# Patient Record
Sex: MALE | Race: BLACK OR AFRICAN AMERICAN | NOT HISPANIC OR LATINO | URBAN - METROPOLITAN AREA
[De-identification: names, ages, dates, MRNs, and addresses within clinical notes are randomized per-mention and may not be internally consistent; named-entity substitution may affect disease eponyms.]

---

## 2019-04-19 ENCOUNTER — INPATIENT (INPATIENT)
Facility: HOSPITAL | Age: 84
LOS: 4 days | Discharge: NURSING, REHAB OR LONG-TERM CARE | DRG: 638 | End: 2019-04-24
Attending: INTERNAL MEDICINE | Admitting: INTERNAL MEDICINE
Payer: MEDICARE

## 2019-04-19 VITALS
OXYGEN SATURATION: 99 % | DIASTOLIC BLOOD PRESSURE: 95 MMHG | SYSTOLIC BLOOD PRESSURE: 215 MMHG | RESPIRATION RATE: 18 BRPM | HEART RATE: 74 BPM

## 2019-04-19 DIAGNOSIS — E16.2 HYPOGLYCEMIA, UNSPECIFIED: ICD-10-CM

## 2019-04-19 DIAGNOSIS — Z98.49 CATARACT EXTRACTION STATUS, UNSPECIFIED EYE: Chronic | ICD-10-CM

## 2019-04-19 LAB
ACETONE SERPL-MCNC: NEGATIVE — SIGNIFICANT CHANGE UP
ALBUMIN SERPL ELPH-MCNC: 3.7 G/DL — SIGNIFICANT CHANGE UP (ref 3.5–5)
ALP SERPL-CCNC: 70 U/L — SIGNIFICANT CHANGE UP (ref 40–120)
ALT FLD-CCNC: 23 U/L DA — SIGNIFICANT CHANGE UP (ref 10–60)
ANION GAP SERPL CALC-SCNC: 6 MMOL/L — SIGNIFICANT CHANGE UP (ref 5–17)
ANISOCYTOSIS BLD QL: SLIGHT — SIGNIFICANT CHANGE UP
AST SERPL-CCNC: 22 U/L — SIGNIFICANT CHANGE UP (ref 10–40)
BASOPHILS # BLD AUTO: 0.03 K/UL — SIGNIFICANT CHANGE UP (ref 0–0.2)
BASOPHILS NFR BLD AUTO: 0.3 % — SIGNIFICANT CHANGE UP (ref 0–2)
BILIRUB SERPL-MCNC: 0.7 MG/DL — SIGNIFICANT CHANGE UP (ref 0.2–1.2)
BUN SERPL-MCNC: 11 MG/DL — SIGNIFICANT CHANGE UP (ref 7–18)
CALCIUM SERPL-MCNC: 8.9 MG/DL — SIGNIFICANT CHANGE UP (ref 8.4–10.5)
CHLORIDE SERPL-SCNC: 108 MMOL/L — SIGNIFICANT CHANGE UP (ref 96–108)
CO2 SERPL-SCNC: 30 MMOL/L — SIGNIFICANT CHANGE UP (ref 22–31)
CREAT SERPL-MCNC: 1.04 MG/DL — SIGNIFICANT CHANGE UP (ref 0.5–1.3)
EOSINOPHIL # BLD AUTO: 0.18 K/UL — SIGNIFICANT CHANGE UP (ref 0–0.5)
EOSINOPHIL NFR BLD AUTO: 2.1 % — SIGNIFICANT CHANGE UP (ref 0–6)
GLUCOSE SERPL-MCNC: 33 MG/DL — CRITICAL LOW (ref 70–99)
HCT VFR BLD CALC: 42.5 % — SIGNIFICANT CHANGE UP (ref 39–50)
HGB BLD-MCNC: 13.7 G/DL — SIGNIFICANT CHANGE UP (ref 13–17)
HYPOCHROMIA BLD QL: SLIGHT — SIGNIFICANT CHANGE UP
IMM GRANULOCYTES NFR BLD AUTO: 0.2 % — SIGNIFICANT CHANGE UP (ref 0–1.5)
LYMPHOCYTES # BLD AUTO: 2.35 K/UL — SIGNIFICANT CHANGE UP (ref 1–3.3)
LYMPHOCYTES # BLD AUTO: 27.2 % — SIGNIFICANT CHANGE UP (ref 13–44)
MAGNESIUM SERPL-MCNC: 2.2 MG/DL — SIGNIFICANT CHANGE UP (ref 1.6–2.6)
MANUAL SMEAR VERIFICATION: SIGNIFICANT CHANGE UP
MCHC RBC-ENTMCNC: 23.9 PG — LOW (ref 27–34)
MCHC RBC-ENTMCNC: 32.2 GM/DL — SIGNIFICANT CHANGE UP (ref 32–36)
MCV RBC AUTO: 74 FL — LOW (ref 80–100)
MICROCYTES BLD QL: SLIGHT — SIGNIFICANT CHANGE UP
MONOCYTES # BLD AUTO: 0.75 K/UL — SIGNIFICANT CHANGE UP (ref 0–0.9)
MONOCYTES NFR BLD AUTO: 8.7 % — SIGNIFICANT CHANGE UP (ref 2–14)
NEUTROPHILS # BLD AUTO: 5.32 K/UL — SIGNIFICANT CHANGE UP (ref 1.8–7.4)
NEUTROPHILS NFR BLD AUTO: 61.5 % — SIGNIFICANT CHANGE UP (ref 43–77)
NRBC # BLD: 0 /100 WBCS — SIGNIFICANT CHANGE UP (ref 0–0)
NT-PROBNP SERPL-SCNC: 407 PG/ML — SIGNIFICANT CHANGE UP (ref 0–450)
PLAT MORPH BLD: NORMAL — SIGNIFICANT CHANGE UP
PLATELET # BLD AUTO: 228 K/UL — SIGNIFICANT CHANGE UP (ref 150–400)
POIKILOCYTOSIS BLD QL AUTO: SLIGHT — SIGNIFICANT CHANGE UP
POTASSIUM SERPL-MCNC: 3.5 MMOL/L — SIGNIFICANT CHANGE UP (ref 3.5–5.3)
POTASSIUM SERPL-SCNC: 3.5 MMOL/L — SIGNIFICANT CHANGE UP (ref 3.5–5.3)
PROT SERPL-MCNC: 7.6 G/DL — SIGNIFICANT CHANGE UP (ref 6–8.3)
RBC # BLD: 5.74 M/UL — SIGNIFICANT CHANGE UP (ref 4.2–5.8)
RBC # FLD: 15.2 % — HIGH (ref 10.3–14.5)
RBC BLD AUTO: ABNORMAL
SCHISTOCYTES BLD QL AUTO: SLIGHT — SIGNIFICANT CHANGE UP
SODIUM SERPL-SCNC: 144 MMOL/L — SIGNIFICANT CHANGE UP (ref 135–145)
TARGETS BLD QL SMEAR: SLIGHT — SIGNIFICANT CHANGE UP
WBC # BLD: 8.65 K/UL — SIGNIFICANT CHANGE UP (ref 3.8–10.5)
WBC # FLD AUTO: 8.65 K/UL — SIGNIFICANT CHANGE UP (ref 3.8–10.5)

## 2019-04-19 PROCEDURE — 99223 1ST HOSP IP/OBS HIGH 75: CPT

## 2019-04-19 PROCEDURE — 99291 CRITICAL CARE FIRST HOUR: CPT

## 2019-04-19 PROCEDURE — 70450 CT HEAD/BRAIN W/O DYE: CPT | Mod: 26

## 2019-04-19 PROCEDURE — 71045 X-RAY EXAM CHEST 1 VIEW: CPT | Mod: 26

## 2019-04-19 RX ORDER — HEPARIN SODIUM 5000 [USP'U]/ML
5000 INJECTION INTRAVENOUS; SUBCUTANEOUS EVERY 8 HOURS
Qty: 0 | Refills: 0 | Status: DISCONTINUED | OUTPATIENT
Start: 2019-04-19 | End: 2019-04-24

## 2019-04-19 RX ORDER — CARVEDILOL PHOSPHATE 80 MG/1
12.5 CAPSULE, EXTENDED RELEASE ORAL ONCE
Qty: 0 | Refills: 0 | Status: COMPLETED | OUTPATIENT
Start: 2019-04-19 | End: 2019-04-19

## 2019-04-19 RX ORDER — INSULIN LISPRO 100/ML
VIAL (ML) SUBCUTANEOUS
Qty: 0 | Refills: 0 | Status: DISCONTINUED | OUTPATIENT
Start: 2019-04-19 | End: 2019-04-24

## 2019-04-19 RX ORDER — DEXTROSE 50 % IN WATER 50 %
50 SYRINGE (ML) INTRAVENOUS ONCE
Qty: 0 | Refills: 0 | Status: COMPLETED | OUTPATIENT
Start: 2019-04-19 | End: 2019-04-19

## 2019-04-19 RX ORDER — SODIUM CHLORIDE 9 MG/ML
1000 INJECTION, SOLUTION INTRAVENOUS
Qty: 0 | Refills: 0 | Status: DISCONTINUED | OUTPATIENT
Start: 2019-04-19 | End: 2019-04-20

## 2019-04-19 RX ORDER — DEXTROSE 50 % IN WATER 50 %
25 SYRINGE (ML) INTRAVENOUS ONCE
Qty: 0 | Refills: 0 | Status: DISCONTINUED | OUTPATIENT
Start: 2019-04-19 | End: 2019-04-24

## 2019-04-19 RX ADMIN — CARVEDILOL PHOSPHATE 12.5 MILLIGRAM(S): 80 CAPSULE, EXTENDED RELEASE ORAL at 23:09

## 2019-04-19 RX ADMIN — Medication 50 MILLILITER(S): at 18:32

## 2019-04-19 RX ADMIN — SODIUM CHLORIDE 60 MILLILITER(S): 9 INJECTION, SOLUTION INTRAVENOUS at 23:18

## 2019-04-19 RX ADMIN — Medication 50 MILLILITER(S): at 16:59

## 2019-04-19 NOTE — H&P ADULT - ATTENDING COMMENTS
Vital Signs Last 24 Hrs  T(C): 37.2 (19 Apr 2019 19:15), Max: 37.2 (19 Apr 2019 19:15)  T(F): 99 (19 Apr 2019 19:15), Max: 99 (19 Apr 2019 19:15)  HR: 91 (19 Apr 2019 19:15) (74 - 91)  BP: 179/99 (19 Apr 2019 19:15) (179/99 - 215/95)  BP(mean): --  RR: 18 (19 Apr 2019 19:15) (18 - 18)  SpO2: 99% (19 Apr 2019 19:15) (96% - 99%) He is an 88 year old Faroese man with PMH of DM2 / HTN/ hypothyroidism/ seizure d/o who was brought into the ED on account of being found unresponsive at the base of the stair. There was no witness and the patient cannot give any additional details now. EMS was called and he was found to have a very low glucose level of 29. He received 1 ampoule of dextrose with improvement in mental status and brought to the ED.  Of note, he had similar episode a few months back and was in [facility] for 8 days. ON discharge per wife, he was told to stay off his insulin regimen. He has however continued to use insulin in addition to his OHA. In addition, he had refused to follow up with his PCP in months. He changed to a new PCP (Whit) but is yet to see him.  On admission, his glucose continues to fall below normal range despite glucose infusion intermittently.    Of note, his facial expression seems fixed, he has mild tremors at rest in his hands. His wife states he walks slowly/shuffling and with difficulty. No prior work up for Parkinsonism.    Vital Signs Last 24 Hrs  T(C): 37.2 (19 Apr 2019 19:15), Max: 37.2 (19 Apr 2019 19:15)  T(F): 99 (19 Apr 2019 19:15), Max: 99 (19 Apr 2019 19:15)  HR: 91 (19 Apr 2019 19:15) (74 - 91)  BP: 179/99 (19 Apr 2019 19:15) (179/99 - 215/95)  RR: 18 (19 Apr 2019 19:15) (18 - 18)  SpO2: 99% (19 Apr 2019 19:15) (96% - 99%)    Elderly man, lying in bed, alert, awake, oriented X 3  ?plastic facies, ?pill-rolling tremors  No pallor, no icterus, no lateral tongue laceration, no bruises on the head or body  CTA B/L  RRR, S1S2 only  Soft, NT ND BS +  +2 Pitting pedal edema in the LE   No focal deficits    Available meds reviewed    Labs                        13.7   8.65  )-----------( 228      ( 19 Apr 2019 17:17 )             42.5     04-19    144  |  108  |  11  ----------------------------<  33<LL>  3.5   |  30  |  1.04    Ca    8.9      19 Apr 2019 17:17  Mg     2.2     04-19    TPro  7.6  /  Alb  3.7  /  TBili  0.7  /  DBili  x   /  AST  22  /  ALT  23  /  AlkPhos  70  04-19    pro BNP - 407  CTH -ve  CXR - no acute process    Impression  88 year old man with medical hx as above presenting with features c/w an hypoglycemic coma in the setting of poor compliance with meds and follow up in the outpatient setting. In addition, BP was very high on admission and he has some features that is concerning for possible Parkinsonism/parkinson's dx.    Hypoglycemic coma  DM 2  HTN  Seizure d/o    Plan  Admit to medicine for observation  Continuous glucose infusion.  Serial serum glucose monitoring  Poor compliance on DM2 meds- Endocrine consult  Resume home BP meds and other home meds  Suspicion for possible Parkinson's dx - neurology consult  Ensure compliance with new PCP once discharged He is an 88 year old [place]ian man with PMH of DM2 / HTN/ hypothyroidism/ seizure d/o who was brought into the ED on account of being found unresponsive at the base of the stair. There was no witness and the patient cannot give any additional details now. EMS was called and he was found to have a very low glucose level of 29. He received 1 ampoule of dextrose with improvement in mental status and brought to the ED.  Of note, he had similar episode a few months back and was in [facility] for 8 days. ON discharge per wife, he was told to stay off his insulin regimen. He has however continued to use insulin in addition to his OHA. In addition, he had refused to follow up with his PCP in months. He changed to a new PCP (Whit) but is yet to see him.  On admission, his glucose continues to fall below normal range despite glucose infusion intermittently.    Of note, his facial expression seems fixed, he has mild tremors at rest in his hands. His wife states he walks slowly/shuffling and with difficulty. No prior work up for Parkinsonism.    Vital Signs Last 24 Hrs  T(C): 37.2 (19 Apr 2019 19:15), Max: 37.2 (19 Apr 2019 19:15)  T(F): 99 (19 Apr 2019 19:15), Max: 99 (19 Apr 2019 19:15)  HR: 91 (19 Apr 2019 19:15) (74 - 91)  BP: 179/99 (19 Apr 2019 19:15) (179/99 - 215/95)  RR: 18 (19 Apr 2019 19:15) (18 - 18)  SpO2: 99% (19 Apr 2019 19:15) (96% - 99%)    Elderly man, lying in bed, alert, awake, oriented X 3  ?plastic facies, ?pill-rolling tremors  No pallor, no icterus, no lateral tongue laceration, no bruises on the head or body  CTA B/L  RRR, S1S2 only  Soft, NT ND BS +  +2 Pitting pedal edema in the LE   No focal deficits    Available meds reviewed    Labs                        13.7   8.65  )-----------( 228      ( 19 Apr 2019 17:17 )             42.5     04-19    144  |  108  |  11  ----------------------------<  33<LL>  3.5   |  30  |  1.04    Ca    8.9      19 Apr 2019 17:17  Mg     2.2     04-19    TPro  7.6  /  Alb  3.7  /  TBili  0.7  /  DBili  x   /  AST  22  /  ALT  23  /  AlkPhos  70  04-19    pro BNP - 407  CTH -ve  CXR - no acute process    Impression  88 year old man with medical hx as above presenting with features c/w an hypoglycemic coma in the setting of poor compliance with meds and follow up in the outpatient setting. In addition, BP was very high on admission and he has some features that is concerning for possible Parkinsonism/parkinson's dx.    Hypoglycemic coma  DM 2  HTN  L/E edema    Plan  Admit to medicine for observation  Continuous glucose infusion.  Serial serum glucose monitoring  Poor compliance on DM2 meds- Endocrine consult  Resume home BP meds and other home meds  Suspicion for possible Parkinson's dx - neurology consult  Ensure compliance with new PCP once discharged  2D ECHO

## 2019-04-19 NOTE — ED PROVIDER NOTE - ENMT, MLM
Airway patent, Nasal mucosa clear. Mouth with normal mucosa. Throat has no vesicles, no oropharyngeal exudates and uvula is midline.  [place]

## 2019-04-19 NOTE — ED PROVIDER NOTE - MUSCULOSKELETAL, MLM
Spine appears normal, range of motion is not limited, no muscle or joint tenderness, Lt upper back-redness noted due to ?pressure sore, no CVAT, neck-mid post. superficial lac., no bleeding

## 2019-04-19 NOTE — H&P ADULT - HISTORY OF PRESENT ILLNESS
89 yo Kellie M with PMH of DM2, HTN, hypothyroidism, depression, was brought to the ED due to being found unresponsive at the base of his staircase when his wife came home. Pt cannot give any additional details as to what happened, however EMS was called and found the pt to have a very low glucose level of 29. He received 1 amp of dextrose with improvement in mental status and brought to the ED. Of note, he had similar episode a few months back and was in [facility] for 8 days. On discharge, as per wife, he was told to stay off his insulin regimen. He has however continued to use insulin in addition to repaglinide. In addition, he had refused to follow up with his PCP in months. He changed to a new PCP (Whit) but has yet to see him. On admission, his glucose continues to fall below normal range despite glucose infusion intermittently. Grandchildren at bedside note that he has been having difficulty breathing especially on exertion, and that walking has been proving difficult for him lately due to chronic bilateral LE swelling.

## 2019-04-19 NOTE — ED PROVIDER NOTE - PMH
Depression    DM (diabetes mellitus)    HTN (hypertension)    Hyperlipidemia Depression    DM (diabetes mellitus)    HTN (hypertension)    Hyperlipidemia    Hypothyroid

## 2019-04-19 NOTE — ED ADULT NURSE NOTE - OBJECTIVE STATEMENT
Pt awake, refusing to speak, BIBA, from home, family at bed side. Pt was found on the floor at bottom of staircase. As per family pt is aox3, talkative at baseline. As per family pt has h/o DM, insuline was DC by PCP, but pt continues to self administer it.  EKG done, pt placed on cardiac monitor, no signs of distress noted. Pt denies pain. No active bleeding or abrasions noted.

## 2019-04-19 NOTE — H&P ADULT - PROBLEM SELECTOR PLAN 1
c/w D5 with fluids for now  continue to monitor fingersticks q6h for now  holding off all insulin and oral hypoglycemic agents for now

## 2019-04-19 NOTE — H&P ADULT - PROBLEM SELECTOR PLAN 4
c/w sliding scale for now  f/u HbA1c grandchildren and wife do not know which medication he is currently on  would avoid calcium channel blockers for now in setting of bilateral LE edema

## 2019-04-19 NOTE — H&P ADULT - NSHPPHYSICALEXAM_GEN_ALL_CORE
Vital Signs Last 24 Hrs  T(C): 36.7 (19 Apr 2019 23:28), Max: 37.2 (19 Apr 2019 19:15)  T(F): 98 (19 Apr 2019 23:28), Max: 99 (19 Apr 2019 19:15)  HR: 95 (19 Apr 2019 23:28) (74 - 97)  BP: 181/94 (19 Apr 2019 23:28) (169/99 - 215/95)  RR: 18 (19 Apr 2019 23:28) (18 - 18)  SpO2: 100% (19 Apr 2019 23:28) (96% - 100%)  ________________________________________________  PHYSICAL EXAM:  GENERAL: NAD  HEENT: Normocephalic;  conjunctivae and sclerae clear; moist mucous membranes;   NECK : supple, no JVD  CHEST/LUNG: Clear to auscultation bilaterally with good air entry   HEART: S1 S2  regular; no murmurs, gallops or rubs  ABDOMEN: Soft, Nontender, Nondistended; Bowel sounds present  EXTREMITIES: no cyanosis; no edema; no calf tenderness  NERVOUS SYSTEM:  Awake and alert; Oriented  to place, person and time  PSYCH: masked facies Vital Signs Last 24 Hrs  T(C): 36.7 (19 Apr 2019 23:28), Max: 37.2 (19 Apr 2019 19:15)  T(F): 98 (19 Apr 2019 23:28), Max: 99 (19 Apr 2019 19:15)  HR: 95 (19 Apr 2019 23:28) (74 - 97)  BP: 181/94 (19 Apr 2019 23:28) (169/99 - 215/95)  RR: 18 (19 Apr 2019 23:28) (18 - 18)  SpO2: 100% (19 Apr 2019 23:28) (96% - 100%)  ________________________________________________  PHYSICAL EXAM:  GENERAL: NAD  HEENT: Normocephalic;  conjunctivae and sclerae clear; moist mucous membranes;   NECK : supple, no JVD  CHEST/LUNG: Clear to auscultation bilaterally with good air entry   HEART: S1 S2  regular; no murmurs, gallops or rubs  ABDOMEN: Soft, Nontender, Nondistended; Bowel sounds present  EXTREMITIES: no cyanosis; 2+ pitting edema on bilateral LE; no calf tenderness  NERVOUS SYSTEM:  Awake and alert; Oriented  to place, person and time  PSYCH: masked facies

## 2019-04-19 NOTE — ED ADULT NURSE REASSESSMENT NOTE - NS ED NURSE REASSESS COMMENT FT1
Pt remains stable, AOX2, no s/s of any distress noted. IV line in place, IV fluids infusing as per orders, no signs of infiltration noted. Pt to be feed as per MD. Gallo. VS WNL. Call bell in reach, bed in lowest position. FS monitored q 30mins as per MD Gordonu. Safety maintained, hourly rounding performed. Endorsed to nurse Profit.

## 2019-04-19 NOTE — H&P ADULT - ASSESSMENT
87 yo [place] M with PMH of DM2, HTN, hypothyroidism, depression, was admitted for hypoglycemia 2/2 excess medication administration.       Of note, his facial expression seems fixed, he has mild tremors at rest in his hands. His wife states he walks slowly/shuffling and with difficulty. No prior work up for Parkinsonism.

## 2019-04-19 NOTE — H&P ADULT - PROBLEM SELECTOR PLAN 2
c/w lipitor for now  f/u lipid profile in AM stage 1 pressure injury on the back  likely 2/2 immobility  f/u PT consult

## 2019-04-19 NOTE — H&P ADULT - NSICDXPASTMEDICALHX_GEN_ALL_CORE_FT
PAST MEDICAL HISTORY:  Depression     DM (diabetes mellitus)     HTN (hypertension)     Hyperlipidemia     Hypothyroid

## 2019-04-19 NOTE — ED PROVIDER NOTE - CARE PLAN
Principal Discharge DX:	Hypoglycemia Principal Discharge DX:	Hypoglycemia  Secondary Diagnosis:	Pressure sore  Secondary Diagnosis:	Encephalopathy

## 2019-04-19 NOTE — H&P ADULT - PROBLEM SELECTOR PLAN 3
grandchildren and wife do not know which medication he is currently on  would avoid calcium channel blockers for now in setting of bilateral LE edema c/w lipitor for now  f/u lipid profile in AM

## 2019-04-19 NOTE — ED PROVIDER NOTE - CRITICAL CARE PROVIDED
documentation/additional history taking/consult w/ pt's family directly relating to pts condition/interpretation of diagnostic studies/direct patient care (not related to procedure)

## 2019-04-19 NOTE — ED ADULT NURSE NOTE - NSIMPLEMENTINTERV_GEN_ALL_ED
Implemented All Fall with Harm Risk Interventions:  [place] to call system. Call bell, personal items and telephone within reach. Instruct patient to call for assistance. Room bathroom lighting operational. Non-slip footwear when patient is off stretcher. Physically safe environment: no spills, clutter or unnecessary equipment. Stretcher in lowest position, wheels locked, appropriate side rails in place. Provide visual cue, wrist band, yellow gown, etc. Monitor gait and stability. Monitor for mental status changes and reorient to person, place, and time. Review medications for side effects contributing to fall risk. Reinforce activity limits and safety measures with patient and family. Provide visual clues: red socks.

## 2019-04-19 NOTE — ED ADULT TRIAGE NOTE - CHIEF COMPLAINT QUOTE
Found slumped over at the base of staircase in the basement at home at 1500 hrs by spouse, seen normal at 1100 hrs by spouse, f/s in the field was 29 per ems

## 2019-04-19 NOTE — ED PROVIDER NOTE - OBJECTIVE STATEMENT
89 y/o M patient with a significant PMHx of DM, HTN, HLD, hypothyroidism, depression and significant PSHx of s/p cataract extraction presents to the ED with his granddaughter and wife c/o fall today on the stairs. Patient's wife states that she was shopping, then came back and found the patient bottom of the stairs with no witness. Pt's wife states the patient doesn't remember anything, patient was confused and unresponsive.  Patient's wife reports that the patient takes repaglinide, but is not sure if he took it today. Patient denies any pain or any other complains. 87 y/o M patient with a significant PMHx of DM, HTN, HLD, hypothyroidism, depression and significant PSHx of s/p cataract extraction presents to the ED with his granddaughter and wife c/o fall today on the stairs. Patient's wife states that she was shopping, then came back and found the patient bottom of the stairs with no witness. Pt's wife states the patient doesn't remember anything, patient was confused and unresponsive, appears to be sweaty.  Patient's wife reports that the patient takes repaglinide, pt also has been taking insulin which he was instructed to stop 1 mo. ago, but is not sure if he took it today. Patient denies any pain or any other complains.

## 2019-04-19 NOTE — H&P ADULT - NSHPLABSRESULTS_GEN_ALL_CORE
LABS:      CBC Full  -  ( 19 Apr 2019 17:17 )  WBC Count : 8.65 K/uL  RBC Count : 5.74 M/uL  Hemoglobin : 13.7 g/dL  Hematocrit : 42.5 %  Platelet Count - Automated : 228 K/uL  Mean Cell Volume : 74.0 fl  Mean Cell Hemoglobin : 23.9 pg  Mean Cell Hemoglobin Concentration : 32.2 gm/dL  Auto Neutrophil # : 5.32 K/uL  Auto Lymphocyte # : 2.35 K/uL  Auto Monocyte # : 0.75 K/uL  Auto Eosinophil # : 0.18 K/uL  Auto Basophil # : 0.03 K/uL  Auto Neutrophil % : 61.5 %  Auto Lymphocyte % : 27.2 %  Auto Monocyte % : 8.7 %  Auto Eosinophil % : 2.1 %  Auto Basophil % : 0.3 %    04-19    144  |  108  |  11  ----------------------------<  33<LL>  3.5   |  30  |  1.04    Ca    8.9      19 Apr 2019 17:17  Mg     2.2     04-19    TPro  7.6  /  Alb  3.7  /  TBili  0.7  /  DBili  x   /  AST  22  /  ALT  23  /  AlkPhos  70  04-19      RADIOLOGY & ADDITIONAL STUDIES (The following images were personally reviewed):    CT head: no acute intracranial hemorrhage or pathology    CXR: no acute lung pathology, cardiac silhouette normal

## 2019-04-19 NOTE — ED ADULT NURSE NOTE - NEURO ASSESSMENT
[facility], these were recent PTA AMADO, not following command.  XI:  AMADO not following command  XII:  AMADO of multiple physiological parameters, high complexity decision making to assess, manipulate, and support vital system function to treat multi system failure and to prevent further life threatening deterioration of the patient's condition.     I provided 50 minutes of Critical Care to this patient. This included review of recent events, clinical examination and review of data on multiple occasions, management of multiple organ systems, and discussion about treatment with the members of the multidisciplinary ICU team and documentation. This time does not include time spent in performing any separately billed procedures.    Mata Carpenter MD  Neurocritical Care  Pager # [phone]     - - -

## 2019-04-20 DIAGNOSIS — E03.9 HYPOTHYROIDISM, UNSPECIFIED: ICD-10-CM

## 2019-04-20 DIAGNOSIS — E16.2 HYPOGLYCEMIA, UNSPECIFIED: ICD-10-CM

## 2019-04-20 DIAGNOSIS — I10 ESSENTIAL (PRIMARY) HYPERTENSION: ICD-10-CM

## 2019-04-20 DIAGNOSIS — E11.9 TYPE 2 DIABETES MELLITUS WITHOUT COMPLICATIONS: ICD-10-CM

## 2019-04-20 DIAGNOSIS — E78.5 HYPERLIPIDEMIA, UNSPECIFIED: ICD-10-CM

## 2019-04-20 DIAGNOSIS — F32.9 MAJOR DEPRESSIVE DISORDER, SINGLE EPISODE, UNSPECIFIED: ICD-10-CM

## 2019-04-20 DIAGNOSIS — R26.81 UNSTEADINESS ON FEET: ICD-10-CM

## 2019-04-20 DIAGNOSIS — R60.0 LOCALIZED EDEMA: ICD-10-CM

## 2019-04-20 DIAGNOSIS — Z29.9 ENCOUNTER FOR PROPHYLACTIC MEASURES, UNSPECIFIED: ICD-10-CM

## 2019-04-20 DIAGNOSIS — L89.90 PRESSURE ULCER OF UNSPECIFIED SITE, UNSPECIFIED STAGE: ICD-10-CM

## 2019-04-20 LAB
ANION GAP SERPL CALC-SCNC: 3 MMOL/L — LOW (ref 5–17)
APPEARANCE UR: ABNORMAL
BACTERIA # UR AUTO: ABNORMAL /HPF
BILIRUB UR-MCNC: NEGATIVE — SIGNIFICANT CHANGE UP
BUN SERPL-MCNC: 9 MG/DL — SIGNIFICANT CHANGE UP (ref 7–18)
CALCIUM SERPL-MCNC: 8.4 MG/DL — SIGNIFICANT CHANGE UP (ref 8.4–10.5)
CHLORIDE SERPL-SCNC: 106 MMOL/L — SIGNIFICANT CHANGE UP (ref 96–108)
CHOLEST SERPL-MCNC: 99 MG/DL — SIGNIFICANT CHANGE UP (ref 10–199)
CO2 SERPL-SCNC: 31 MMOL/L — SIGNIFICANT CHANGE UP (ref 22–31)
COLOR SPEC: YELLOW — SIGNIFICANT CHANGE UP
COMMENT - URINE: SIGNIFICANT CHANGE UP
CREAT SERPL-MCNC: 0.94 MG/DL — SIGNIFICANT CHANGE UP (ref 0.5–1.3)
DIFF PNL FLD: ABNORMAL
EPI CELLS # UR: SIGNIFICANT CHANGE UP /HPF
GLUCOSE SERPL-MCNC: 119 MG/DL — HIGH (ref 70–99)
GLUCOSE UR QL: NEGATIVE — SIGNIFICANT CHANGE UP
HBA1C BLD-MCNC: 8.4 % — HIGH (ref 4–5.6)
HCT VFR BLD CALC: 39.7 % — SIGNIFICANT CHANGE UP (ref 39–50)
HDLC SERPL-MCNC: 49 MG/DL — SIGNIFICANT CHANGE UP
HGB BLD-MCNC: 13 G/DL — SIGNIFICANT CHANGE UP (ref 13–17)
KETONES UR-MCNC: NEGATIVE — SIGNIFICANT CHANGE UP
LEUKOCYTE ESTERASE UR-ACNC: ABNORMAL
LIPID PNL WITH DIRECT LDL SERPL: 39 MG/DL — SIGNIFICANT CHANGE UP
MAGNESIUM SERPL-MCNC: 1.9 MG/DL — SIGNIFICANT CHANGE UP (ref 1.6–2.6)
MCHC RBC-ENTMCNC: 23.8 PG — LOW (ref 27–34)
MCHC RBC-ENTMCNC: 32.7 GM/DL — SIGNIFICANT CHANGE UP (ref 32–36)
MCV RBC AUTO: 72.7 FL — LOW (ref 80–100)
NITRITE UR-MCNC: NEGATIVE — SIGNIFICANT CHANGE UP
NRBC # BLD: 0 /100 WBCS — SIGNIFICANT CHANGE UP (ref 0–0)
PH UR: 7 — SIGNIFICANT CHANGE UP (ref 5–8)
PHOSPHATE SERPL-MCNC: 3.6 MG/DL — SIGNIFICANT CHANGE UP (ref 2.5–4.5)
PLATELET # BLD AUTO: 213 K/UL — SIGNIFICANT CHANGE UP (ref 150–400)
POTASSIUM SERPL-MCNC: 3.6 MMOL/L — SIGNIFICANT CHANGE UP (ref 3.5–5.3)
POTASSIUM SERPL-SCNC: 3.6 MMOL/L — SIGNIFICANT CHANGE UP (ref 3.5–5.3)
PROT UR-MCNC: 100
RBC # BLD: 5.46 M/UL — SIGNIFICANT CHANGE UP (ref 4.2–5.8)
RBC # FLD: 15 % — HIGH (ref 10.3–14.5)
RBC CASTS # UR COMP ASSIST: ABNORMAL /HPF (ref 0–2)
SODIUM SERPL-SCNC: 140 MMOL/L — SIGNIFICANT CHANGE UP (ref 135–145)
SP GR SPEC: 1 — LOW (ref 1.01–1.02)
TOTAL CHOLESTEROL/HDL RATIO MEASUREMENT: 2 RATIO — LOW (ref 3.4–9.6)
TRIGL SERPL-MCNC: 55 MG/DL — SIGNIFICANT CHANGE UP (ref 10–149)
TSH SERPL-MCNC: 3.09 UU/ML — SIGNIFICANT CHANGE UP (ref 0.34–4.82)
UROBILINOGEN FLD QL: NEGATIVE — SIGNIFICANT CHANGE UP
VIT B12 SERPL-MCNC: 440 PG/ML — SIGNIFICANT CHANGE UP (ref 232–1245)
WBC # BLD: 9.71 K/UL — SIGNIFICANT CHANGE UP (ref 3.8–10.5)
WBC # FLD AUTO: 9.71 K/UL — SIGNIFICANT CHANGE UP (ref 3.8–10.5)
WBC UR QL: ABNORMAL /HPF (ref 0–5)

## 2019-04-20 PROCEDURE — 99233 SBSQ HOSP IP/OBS HIGH 50: CPT | Mod: GC

## 2019-04-20 RX ORDER — LEVOTHYROXINE SODIUM 125 MCG
25 TABLET ORAL DAILY
Qty: 0 | Refills: 0 | Status: DISCONTINUED | OUTPATIENT
Start: 2019-04-20 | End: 2019-04-24

## 2019-04-20 RX ORDER — LOSARTAN POTASSIUM 100 MG/1
25 TABLET, FILM COATED ORAL DAILY
Qty: 0 | Refills: 0 | Status: DISCONTINUED | OUTPATIENT
Start: 2019-04-20 | End: 2019-04-20

## 2019-04-20 RX ORDER — CARVEDILOL PHOSPHATE 80 MG/1
6.25 CAPSULE, EXTENDED RELEASE ORAL ONCE
Qty: 0 | Refills: 0 | Status: COMPLETED | OUTPATIENT
Start: 2019-04-20 | End: 2019-04-20

## 2019-04-20 RX ORDER — DIVALPROEX SODIUM 500 MG/1
125 TABLET, DELAYED RELEASE ORAL THREE TIMES A DAY
Qty: 0 | Refills: 0 | Status: DISCONTINUED | OUTPATIENT
Start: 2019-04-20 | End: 2019-04-24

## 2019-04-20 RX ORDER — ATORVASTATIN CALCIUM 80 MG/1
20 TABLET, FILM COATED ORAL AT BEDTIME
Qty: 0 | Refills: 0 | Status: DISCONTINUED | OUTPATIENT
Start: 2019-04-20 | End: 2019-04-24

## 2019-04-20 RX ORDER — CARVEDILOL PHOSPHATE 80 MG/1
6.25 CAPSULE, EXTENDED RELEASE ORAL EVERY 12 HOURS
Qty: 0 | Refills: 0 | Status: DISCONTINUED | OUTPATIENT
Start: 2019-04-20 | End: 2019-04-24

## 2019-04-20 RX ORDER — FUROSEMIDE 40 MG
20 TABLET ORAL DAILY
Qty: 0 | Refills: 0 | Status: DISCONTINUED | OUTPATIENT
Start: 2019-04-20 | End: 2019-04-24

## 2019-04-20 RX ORDER — LOSARTAN POTASSIUM 100 MG/1
50 TABLET, FILM COATED ORAL DAILY
Qty: 0 | Refills: 0 | Status: DISCONTINUED | OUTPATIENT
Start: 2019-04-20 | End: 2019-04-22

## 2019-04-20 RX ORDER — CARVEDILOL PHOSPHATE 80 MG/1
12.5 CAPSULE, EXTENDED RELEASE ORAL EVERY 12 HOURS
Qty: 0 | Refills: 0 | Status: DISCONTINUED | OUTPATIENT
Start: 2019-04-20 | End: 2019-04-20

## 2019-04-20 RX ORDER — HYDRALAZINE HCL 50 MG
5 TABLET ORAL ONCE
Qty: 0 | Refills: 0 | Status: DISCONTINUED | OUTPATIENT
Start: 2019-04-20 | End: 2019-04-20

## 2019-04-20 RX ADMIN — DIVALPROEX SODIUM 125 MILLIGRAM(S): 500 TABLET, DELAYED RELEASE ORAL at 21:01

## 2019-04-20 RX ADMIN — CARVEDILOL PHOSPHATE 12.5 MILLIGRAM(S): 80 CAPSULE, EXTENDED RELEASE ORAL at 06:49

## 2019-04-20 RX ADMIN — ATORVASTATIN CALCIUM 20 MILLIGRAM(S): 80 TABLET, FILM COATED ORAL at 21:01

## 2019-04-20 RX ADMIN — CARVEDILOL PHOSPHATE 6.25 MILLIGRAM(S): 80 CAPSULE, EXTENDED RELEASE ORAL at 03:07

## 2019-04-20 RX ADMIN — HEPARIN SODIUM 5000 UNIT(S): 5000 INJECTION INTRAVENOUS; SUBCUTANEOUS at 21:01

## 2019-04-20 RX ADMIN — Medication 25 MICROGRAM(S): at 06:49

## 2019-04-20 RX ADMIN — DIVALPROEX SODIUM 125 MILLIGRAM(S): 500 TABLET, DELAYED RELEASE ORAL at 13:57

## 2019-04-20 RX ADMIN — DIVALPROEX SODIUM 125 MILLIGRAM(S): 500 TABLET, DELAYED RELEASE ORAL at 06:49

## 2019-04-20 RX ADMIN — HEPARIN SODIUM 5000 UNIT(S): 5000 INJECTION INTRAVENOUS; SUBCUTANEOUS at 13:57

## 2019-04-20 RX ADMIN — Medication 20 MILLIGRAM(S): at 18:22

## 2019-04-20 RX ADMIN — CARVEDILOL PHOSPHATE 6.25 MILLIGRAM(S): 80 CAPSULE, EXTENDED RELEASE ORAL at 20:57

## 2019-04-20 RX ADMIN — HEPARIN SODIUM 5000 UNIT(S): 5000 INJECTION INTRAVENOUS; SUBCUTANEOUS at 06:49

## 2019-04-20 RX ADMIN — LOSARTAN POTASSIUM 25 MILLIGRAM(S): 100 TABLET, FILM COATED ORAL at 06:49

## 2019-04-20 RX ADMIN — Medication 2: at 18:22

## 2019-04-20 RX ADMIN — CARVEDILOL PHOSPHATE 6.25 MILLIGRAM(S): 80 CAPSULE, EXTENDED RELEASE ORAL at 18:21

## 2019-04-20 NOTE — PROGRESS NOTE ADULT - PROBLEM SELECTOR PLAN 5
c/w sliding scale for now  f/u HbA1c C/w lipitor Stage 1 pressure injury on the back  - Likely 2/2 immobility  - PT consult

## 2019-04-20 NOTE — PROGRESS NOTE ADULT - PROBLEM SELECTOR PLAN 6
c/w synthroid 25mcg  f/u TSH Has hx of HTN  - Increased losartan to 50 qd  - Decreased carvedilol to 6.25 bid due to hypoglycemia  - No CCB for now as patient has LE edema  - Start lasix 20 PO qd C/w lipitor

## 2019-04-20 NOTE — PROGRESS NOTE ADULT - PROBLEM SELECTOR PLAN 9
IMPROVE VTE Individual Risk Assessment    RISK                                                          Points  [] Previous VTE                                          3  [] Thrombophilia                                         2  [] Lower limb paralysis                                2   [] Current Cancer                                        2   [x] Immobilization > 24 hrs                          1  [] ICU/CCU stay > 24 hours                          1  [x] Age > 60                                                1    Improve Score 2.  HSQ for VTE ppx. C/w divalproex at home dose

## 2019-04-20 NOTE — PROGRESS NOTE ADULT - PROBLEM SELECTOR PLAN 2
stage 1 pressure injury on the back  likely 2/2 immobility  f/u PT consult Hx of DM on repaglinide  - Insulin previously dc but continued taking at home  - Sliding scale only for now  - Fs q6h  - F/u A1c  - Endo consult Dr Jules

## 2019-04-20 NOTE — PROGRESS NOTE ADULT - PROBLEM SELECTOR PLAN 7
c/w divalproex at home dose C/w synthroid 25mcg  - TSH wnl Has hx of HTN  - Increased losartan to 50 qd  - Decreased carvedilol to 6.25 bid due to hypoglycemia  - No CCB for now as patient has LE edema  - Start lasix 20 PO qd

## 2019-04-20 NOTE — CHART NOTE - NSCHARTNOTEFT_GEN_A_CORE
Asymptomatic HTN:    Patient remains hypertensive s/p 12.5mg of Coreg. Patient states he has history of HTN and does not take any meds for his BP. Current /99 and HR 98.   - Coreg 6.25 ordered   - Reassess BP. If remains hypertensive then consider IV hydralazine or another 6.25mg coreg for total dose of 25mg depending on pulse.   - ARB to be started in AM  - No CCB since patient presents with BLE edema

## 2019-04-20 NOTE — PROGRESS NOTE ADULT - PROBLEM SELECTOR PLAN 4
grandchildren and wife do not know which medication he is currently on  would avoid calcium channel blockers for now in setting of bilateral LE edema Stage 1 pressure injury on the back  - Likely 2/2 immobility  - PT consult Wife reports gait instability, shuffling gait  - Tremors on admission however no longer present on exam, may have been 2/2 symptomatic hypoglycemia  - No previous w/u for Parkinson's but unlikely to have new onset Parkinson's now  - Can f/u with PCP outpatient  - F/u B12  - PT eval

## 2019-04-20 NOTE — PROGRESS NOTE ADULT - PROBLEM SELECTOR PLAN 10
IMPROVE VTE Individual Risk Assessment    RISK                                                          Points  [] Previous VTE                                          3  [] Thrombophilia                                         2  [] Lower limb paralysis                                2   [] Current Cancer                                        2   [x] Immobilization > 24 hrs                          1  [] ICU/CCU stay > 24 hours                          1  [x] Age > 60                                                1    Improve Score 2.  HSQ for VTE ppx.

## 2019-04-20 NOTE — PROGRESS NOTE ADULT - PROBLEM SELECTOR PLAN 3
c/w lipitor for now  f/u lipid profile in AM B/l LE edema, had some SOB on admission  - BUN/Cr wnl, may be 2/2 CHF  - Denies current SOB  - Lungs cta b/l without crackles  - Started lasix 20 qd  - F/u echo

## 2019-04-20 NOTE — PROGRESS NOTE ADULT - ASSESSMENT
89 yo Nepalese M with PMH of DM2, HTN, hypothyroidism, depression, was admitted for hypoglycemia 2/2 excess medication administration.       Of note, his facial expression seems fixed, he has mild tremors at rest in his hands. His wife states he walks slowly/shuffling and with difficulty. No prior work up for Parkinsonism. 89 yo Faroese M with PMH of DM2, HTN, hypothyroidism, depression, was admitted for hypoglycemia 2/2 excess medication administration.

## 2019-04-20 NOTE — PROGRESS NOTE ADULT - PROBLEM SELECTOR PLAN 1
c/w D5 with fluids for now  continue to monitor fingersticks q6h for now  holding off all insulin and oral hypoglycemic agents for now P/w low blood glucose, symptomatic, found unconscious by wife  - May be 2/2 taking insulin after being told not to, may be due to poor po intake  - C/w D5 half NS  - Monitor fs q6h  - Only sliding scale for now  - Endocrine consult Dr Jules

## 2019-04-20 NOTE — PROGRESS NOTE ADULT - SUBJECTIVE AND OBJECTIVE BOX
PGY1 Note discussed with Supervising Resident and Primary Attending.    Patient is a 88y old  Male who presents with a chief complaint of hypoglycemia (2019 20:30)      INTERVAL HPI/OVERNIGHT EVENTS :  No acute overnight events. Patient seen and examined at bedside. Patient has no current complaints. Patient denies nausea, vomiting, diarrhea, chest pain, SOB, headache.  MEDICATIONS  (STANDING):  atorvastatin 20 milliGRAM(s) Oral at bedtime  carvedilol 12.5 milliGRAM(s) Oral every 12 hours  dextrose 5% + sodium chloride 0.45%. 1000 milliLiter(s) (60 mL/Hr) IV Continuous <Continuous>  dextrose 50% Injectable 25 Gram(s) IV Push once  diVALproex Sprinkle 125 milliGRAM(s) Oral three times a day  heparin  Injectable 5000 Unit(s) SubCutaneous every 8 hours  insulin lispro (HumaLOG) corrective regimen sliding scale   SubCutaneous three times a day before meals  levothyroxine 25 MICROGram(s) Oral daily  losartan 25 milliGRAM(s) Oral daily    MEDICATIONS  (PRN):      Allergies    No Known Allergies    Intolerances        REVIEW OF SYSTEMS :  * General: denies chills, fatigue  * Eyes: denies vision changes  * Respiratory: denies cough, SOB, wheezing  * Cardio: denies chest pain, palpitations  * GI: denies abd pain, nausea, vomiting, diarrhea  * : denies dysuria  * Neuro: denies headaches, numbness, weakness  * MSK: denies joint pain  * Skin: denies itching, rashes    Vital Signs Last 24 Hrs  T(C): 36.8 (2019 08:05), Max: 37.2 (2019 19:15)  T(F): 98.3 (2019 08:05), Max: 99 (2019 19:15)  HR: 76 (2019 08:05) (74 - 98)  BP: 137/70 (2019 08:05) (137/70 - 215/95)  BP(mean): --  RR: 18 (2019 08:05) (16 - 18)  SpO2: 98% (2019 08:05) (96% - 100%)    PHYSICAL EXAM :  * General: no acute distress, well-nourished, well-developed  * HEENT: atraumatic, normocephalic; moist mucus membranes; supple neck; no JVD  * CN: EOMI, PERRL  * Respiratory: cta b/l; no wheezes, rales, rhonchi  * Cardio: RRR; no appreciable murmurs, rubs, gallops  * Abd: soft, nontender, nondistended, +BS  * Neuro: AAOx3; strength 5/5; sensation intact throughout  * Extremities: no clubbing, cyanosis, edema  * Skin: no rashes    LABS:                          13.0   9.71  )-----------( 213      ( 2019 06:15 )             39.7     04-20    140  |  106  |  9   ----------------------------<  119<H>  3.6   |  31  |  0.94    Ca    8.4      2019 06:15  Phos  3.6     -  Mg     1.9         TPro  7.6  /  Alb  3.7  /  TBili  0.7  /  DBili  x   /  AST  22  /  ALT  23  /  AlkPhos  70        Urinalysis Basic - ( 2019 02:45 )    Color: Yellow / Appearance: Slightly Turbid / S.005 / pH: x  Gluc: x / Ketone: Negative  / Bili: Negative / Urobili: Negative   Blood: x / Protein: 100 / Nitrite: Negative   Leuk Esterase: Moderate / RBC: 2-5 /HPF / WBC 11-25 /HPF   Sq Epi: x / Non Sq Epi: Few /HPF / Bacteria: Many /HPF      CAPILLARY BLOOD GLUCOSE      POCT Blood Glucose.: 131 mg/dL (2019 08:35)  POCT Blood Glucose.: 192 mg/dL (2019 01:11)  POCT Blood Glucose.: 85 mg/dL (2019 20:08)  POCT Blood Glucose.: 131 mg/dL (2019 18:54)  POCT Blood Glucose.: 58 mg/dL (2019 18:05)  POCT Blood Glucose.: 94 mg/dL (2019 17:24)  POCT Blood Glucose.: 58 mg/dL (2019 16:43)  POCT Blood Glucose.: 86 mg/dL (2019 16:19)      RADIOLOGY & ADDITIONAL TESTS:   no new imaging    Imaging Personally Reviewed:    Consultant(s) Notes Reviewed: PGY1 Note discussed with Supervising Resident and Primary Attending.    Patient is a 88y old  Male who presents with a chief complaint of hypoglycemia (2019 20:30)      INTERVAL HPI/OVERNIGHT EVENTS:  HTN overnight received carvedilol x2 doses, adjusting BP meds this AM. Patient seen and examined at bedside. Patient has no current complaints, he denies dizziness, nausea, vomiting, abd pain, weakness, numbness, chills. Will be medically stable for dc after echo. Blood glc more controlled this AM.    MEDICATIONS  (STANDING):  atorvastatin 20 milliGRAM(s) Oral at bedtime  carvedilol 12.5 milliGRAM(s) Oral every 12 hours  dextrose 5% + sodium chloride 0.45%. 1000 milliLiter(s) (60 mL/Hr) IV Continuous <Continuous>  dextrose 50% Injectable 25 Gram(s) IV Push once  diVALproex Sprinkle 125 milliGRAM(s) Oral three times a day  heparin  Injectable 5000 Unit(s) SubCutaneous every 8 hours  insulin lispro (HumaLOG) corrective regimen sliding scale   SubCutaneous three times a day before meals  levothyroxine 25 MICROGram(s) Oral daily  losartan 25 milliGRAM(s) Oral daily    MEDICATIONS  (PRN):      Allergies    No Known Allergies    Intolerances        REVIEW OF SYSTEMS:  * General: denies chills, fatigue  * Eyes: denies vision changes  * Respiratory: denies cough, SOB, wheezing  * Cardio: denies chest pain, palpitations  * GI: denies abd pain, nausea, vomiting, diarrhea  * : denies dysuria  * Neuro: denies headaches, numbness, weakness  * MSK: denies joint pain  * Skin: denies itching, rashes    Vital Signs Last 24 Hrs  T(C): 36.8 (2019 08:05), Max: 37.2 (2019 19:15)  T(F): 98.3 (2019 08:05), Max: 99 (2019 19:15)  HR: 76 (2019 08:05) (74 - 98)  BP: 137/70 (2019 08:05) (137/70 - 215/95)  BP(mean): --  RR: 18 (2019 08:05) (16 - 18)  SpO2: 98% (2019 08:05) (96% - 100%)    PHYSICAL EXAM:  * General: no acute distress, well-nourished, well-developed  * HEENT: atraumatic, normocephalic; moist mucus membranes; supple neck; no JVD  * CN: EOMI, PERRL  * Respiratory: cta b/l; no wheezes, rales, rhonchi  * Cardio: RRR; no appreciable murmurs, rubs, gallops  * Abd: soft, nontender, nondistended, +BS  * Neuro: AAOx3; strength 5/5; sensation intact throughout; no appreciable tremors   * Extremities: no clubbing, cyanosis, edema  * Skin: no rashes    LABS:                          13.0   9.71  )-----------( 213      ( 2019 06:15 )             39.7     -    140  |  106  |  9   ----------------------------<  119<H>  3.6   |  31  |  0.94    Ca    8.4      2019 06:15  Phos  3.6       Mg     1.9         TPro  7.6  /  Alb  3.7  /  TBili  0.7  /  DBili  x   /  AST  22  /  ALT  23  /  AlkPhos  70        Urinalysis Basic - ( 2019 02:45 )    Color: Yellow / Appearance: Slightly Turbid / S.005 / pH: x  Gluc: x / Ketone: Negative  / Bili: Negative / Urobili: Negative   Blood: x / Protein: 100 / Nitrite: Negative   Leuk Esterase: Moderate / RBC: 2-5 /HPF / WBC 11-25 /HPF   Sq Epi: x / Non Sq Epi: Few /HPF / Bacteria: Many /HPF      CAPILLARY BLOOD GLUCOSE      POCT Blood Glucose.: 131 mg/dL (2019 08:35)  POCT Blood Glucose.: 192 mg/dL (2019 01:11)  POCT Blood Glucose.: 85 mg/dL (2019 20:08)  POCT Blood Glucose.: 131 mg/dL (2019 18:54)  POCT Blood Glucose.: 58 mg/dL (2019 18:05)  POCT Blood Glucose.: 94 mg/dL (2019 17:24)  POCT Blood Glucose.: 58 mg/dL (2019 16:43)  POCT Blood Glucose.: 86 mg/dL (2019 16:19)      RADIOLOGY & ADDITIONAL TESTS:   no new imaging    Consultant(s) Notes Reviewed: none

## 2019-04-21 DIAGNOSIS — N39.0 URINARY TRACT INFECTION, SITE NOT SPECIFIED: ICD-10-CM

## 2019-04-21 LAB
24R-OH-CALCIDIOL SERPL-MCNC: 29.6 NG/ML — LOW (ref 30–80)
ANION GAP SERPL CALC-SCNC: 5 MMOL/L — SIGNIFICANT CHANGE UP (ref 5–17)
BUN SERPL-MCNC: 12 MG/DL — SIGNIFICANT CHANGE UP (ref 7–18)
CALCIUM SERPL-MCNC: 8 MG/DL — LOW (ref 8.4–10.5)
CHLORIDE SERPL-SCNC: 104 MMOL/L — SIGNIFICANT CHANGE UP (ref 96–108)
CO2 SERPL-SCNC: 30 MMOL/L — SIGNIFICANT CHANGE UP (ref 22–31)
CREAT SERPL-MCNC: 1.01 MG/DL — SIGNIFICANT CHANGE UP (ref 0.5–1.3)
GLUCOSE SERPL-MCNC: 117 MG/DL — HIGH (ref 70–99)
POTASSIUM SERPL-MCNC: 3.7 MMOL/L — SIGNIFICANT CHANGE UP (ref 3.5–5.3)
POTASSIUM SERPL-SCNC: 3.7 MMOL/L — SIGNIFICANT CHANGE UP (ref 3.5–5.3)
SODIUM SERPL-SCNC: 139 MMOL/L — SIGNIFICANT CHANGE UP (ref 135–145)

## 2019-04-21 PROCEDURE — 99233 SBSQ HOSP IP/OBS HIGH 50: CPT

## 2019-04-21 RX ADMIN — Medication 25 MICROGRAM(S): at 06:16

## 2019-04-21 RX ADMIN — Medication 20 MILLIGRAM(S): at 06:16

## 2019-04-21 RX ADMIN — Medication 3: at 17:41

## 2019-04-21 RX ADMIN — CARVEDILOL PHOSPHATE 6.25 MILLIGRAM(S): 80 CAPSULE, EXTENDED RELEASE ORAL at 17:41

## 2019-04-21 RX ADMIN — ATORVASTATIN CALCIUM 20 MILLIGRAM(S): 80 TABLET, FILM COATED ORAL at 22:51

## 2019-04-21 RX ADMIN — HEPARIN SODIUM 5000 UNIT(S): 5000 INJECTION INTRAVENOUS; SUBCUTANEOUS at 14:23

## 2019-04-21 RX ADMIN — LOSARTAN POTASSIUM 50 MILLIGRAM(S): 100 TABLET, FILM COATED ORAL at 06:16

## 2019-04-21 RX ADMIN — HEPARIN SODIUM 5000 UNIT(S): 5000 INJECTION INTRAVENOUS; SUBCUTANEOUS at 06:17

## 2019-04-21 RX ADMIN — DIVALPROEX SODIUM 125 MILLIGRAM(S): 500 TABLET, DELAYED RELEASE ORAL at 06:17

## 2019-04-21 RX ADMIN — HEPARIN SODIUM 5000 UNIT(S): 5000 INJECTION INTRAVENOUS; SUBCUTANEOUS at 22:51

## 2019-04-21 RX ADMIN — Medication 1: at 11:51

## 2019-04-21 RX ADMIN — CARVEDILOL PHOSPHATE 6.25 MILLIGRAM(S): 80 CAPSULE, EXTENDED RELEASE ORAL at 06:16

## 2019-04-21 RX ADMIN — DIVALPROEX SODIUM 125 MILLIGRAM(S): 500 TABLET, DELAYED RELEASE ORAL at 22:52

## 2019-04-21 RX ADMIN — DIVALPROEX SODIUM 125 MILLIGRAM(S): 500 TABLET, DELAYED RELEASE ORAL at 14:23

## 2019-04-21 NOTE — PROGRESS NOTE ADULT - PROBLEM SELECTOR PLAN 10
Malodorous urine, positive u/a and culture.  Will give empirical oral antibiotic, Levaquin, adjusted for eGFR.

## 2019-04-21 NOTE — PROGRESS NOTE ADULT - PROBLEM SELECTOR PLAN 2
Hx of DM on repaglinide  - Insulin previously dc but continued taking at home  - Sliding scale only for now  - Fs q6h  - F/u A1c  - Endo consult Dr Jules

## 2019-04-21 NOTE — PROGRESS NOTE ADULT - PROBLEM SELECTOR PLAN 3
B/l LE edema, had some SOB on admission  - BUN/Cr wnl, may be 2/2 CHF  - Denies current SOB  - Lungs cta b/l without crackles  - Started lasix 20 qd  - F/u echo

## 2019-04-21 NOTE — PROGRESS NOTE ADULT - PROBLEM SELECTOR PLAN 4
Wife reports gait instability, shuffling gait  - Tremors on admission however no longer present on exam, may have been 2/2 symptomatic hypoglycemia  - No previous w/u for Parkinson's but unlikely to have new onset Parkinson's now  - Can f/u with PCP outpatient  - F/u B12  - PT eval

## 2019-04-21 NOTE — CONSULT NOTE ADULT - SUBJECTIVE AND OBJECTIVE BOX
Patient is a 88y old  Male who presents with a chief complaint of hypoglycemia (2019 09:10)      HPI:  87 yo Kellie DAVISON with PMH of DM2, HTN, hypothyroidism, depression, was brought to the ED due to being found unresponsive at the base of his staircase when his wife came home. Pt cannot give any additional details as to what happened, however EMS was called and found the pt to have a very low glucose level of 29. He received 1 amp of dextrose with improvement in mental status and brought to the ED. Of note, he had similar episode a few months back and was in [facility] for 8 days. On discharge, as per wife, he was told to stay off his insulin regimen. He has however continued to use insulin in addition to repaglinide. In addition, he had refused to follow up with his PCP in months. He changed to a new PCP (Whit) but has yet to see him. On admission, his glucose continues to fall below normal range despite glucose infusion intermittently. Grandchildren at bedside note that he has been having difficulty breathing especially on exertion, and that walking has been proving difficult for him lately due to chronic bilateral LE swelling. (2019 20:30)  Pt admits to taking oral dm meds tid and ? insulin prn if fsg high.     PAST MEDICAL & SURGICAL HISTORY:  Hypothyroid  Depression  Hyperlipidemia  HTN (hypertension)  DM (diabetes mellitus)  S/P cataract extraction         MEDICATIONS  (STANDING):  atorvastatin 20 milliGRAM(s) Oral at bedtime  carvedilol 6.25 milliGRAM(s) Oral every 12 hours  dextrose 50% Injectable 25 Gram(s) IV Push once  diVALproex Sprinkle 125 milliGRAM(s) Oral three times a day  furosemide    Tablet 20 milliGRAM(s) Oral daily  heparin  Injectable 5000 Unit(s) SubCutaneous every 8 hours  insulin lispro (HumaLOG) corrective regimen sliding scale   SubCutaneous three times a day before meals  levothyroxine 25 MICROGram(s) Oral daily  losartan 50 milliGRAM(s) Oral daily    MEDICATIONS  (PRN):      FAMILY HISTORY:  No pertinent family history in first degree relatives      SOCIAL HISTORY:      REVIEW OF SYSTEMS:  CONSTITUTIONAL: No fever, weight loss, or fatigue  EYES: No eye pain, visual disturbances, or discharge  ENT:  No difficulty hearing, tinnitus, vertigo; No sinus or throat pain  NECK: No pain or stiffness  RESPIRATORY: No cough, wheezing, chills or hemoptysis; No Shortness of Breath  CARDIOVASCULAR: No chest pain, palpitations, passing out, dizziness, or leg swelling  GASTROINTESTINAL: No abdominal or epigastric pain. No nausea, vomiting, or hematemesis; No diarrhea or constipation. No melena or hematochezia.  GENITOURINARY: No dysuria, frequency, hematuria, or incontinence  NEUROLOGICAL: No headaches, memory loss, loss of strength, numbness, or tremors  SKIN: No itching, burning, rashes, or lesions   LYMPH Nodes: No enlarged glands  ENDOCRINE: No heat or cold intolerance; No hair loss  MUSCULOSKELETAL: No joint pain or swelling; No muscle, back, or extremity pain  PSYCHIATRIC: No depression, anxiety, mood swings, or difficulty sleeping  HEME/LYMPH: No easy bruising, or bleeding gums  ALLERGY AND IMMUNOLOGIC: No hives or eczema	        Vital Signs Last 24 Hrs  T(C): 36.6 (2019 08:18), Max: 37.1 (2019 20:17)  T(F): 97.8 (2019 08:18), Max: 98.7 (2019 20:17)  HR: 69 (2019 08:18) (69 - 81)  BP: 186/76 (2019 08:18) (162/93 - 186/76)  BP(mean): --  RR: 16 (2019 08:18) (16 - 18)  SpO2: 97% (2019 08:18) (95% - 100%)      Constitutional:    HEENT: nad    Neck:  No JVD, bruits or thyromegaly    Respiratory:  Clear without rales or rhonchi    Cardiovascular:  RR without murmur, rub or gallop.    Gastrointestinal: Soft without hepatosplenomegaly.    Extremities: without cyanosis, clubbing or edema.    Neurological:  Oriented   x 3     . No gross sensory or motor defects.        LABS:                        13.0   9.71  )-----------( 213      ( 2019 06:15 )             39.7         139  |  104  |  12  ----------------------------<  117<H>  3.7   |  30  |  1.01    Ca    8.0<L>      2019 05:55  Phos  3.6       Mg     1.9         TPro  7.6  /  Alb  3.7  /  TBili  0.7  /  DBili  x   /  AST  22  /  ALT  23  /  AlkPhos  70          Hemoglobin A1C, Whole Blood (19 @ 10:42)    Hemoglobin A1C, Whole Blood: 8.4: Method: Immunoassay               Urinalysis Basic - ( 2019 02:45 )    Color: Yellow / Appearance: Slightly Turbid / S.005 / pH: x  Gluc: x / Ketone: Negative  / Bili: Negative / Urobili: Negative   Blood: x / Protein: 100 / Nitrite: Negative   Leuk Esterase: Moderate / RBC: 2-5 /HPF / WBC 11-25 /HPF   Sq Epi: x / Non Sq Epi: Few /HPF / Bacteria: Many /HPF      CAPILLARY BLOOD GLUCOSE      POCT Blood Glucose.: 115 mg/dL (2019 08:38)  POCT Blood Glucose.: 113 mg/dL (2019 02:58)  POCT Blood Glucose.: 177 mg/dL (2019 20:59)  POCT Blood Glucose.: 237 mg/dL (2019 17:00)  POCT Blood Glucose.: 151 mg/dL (2019 12:04)      RADIOLOGY & ADDITIONAL STUDIES:

## 2019-04-21 NOTE — PROGRESS NOTE ADULT - SUBJECTIVE AND OBJECTIVE BOX
Medical attending note:     Patient is a 88y old  Male who presents with a chief complaint of hypoglycemia (19 Apr 2019 20:30)      INTERVAL HPI/OVERNIGHT EVENTS:  HTN overnight received carvedilol x2 doses, adjusting BP meds this AM. Patient seen and examined at bedside. Patient has no current complaints, he denies dizziness, nausea, vomiting, abd pain, weakness, numbness, chills. Will be medically stable for dc after echo. Blood glc more controlled this AM.  Still c/o pedal edema.  Family c/o malodorous urine.     MEDICATIONS  (STANDING):  atorvastatin 20 milliGRAM(s) Oral at bedtime  carvedilol 12.5 milliGRAM(s) Oral every 12 hours  dextrose 5% + sodium chloride 0.45%. 1000 milliLiter(s) (60 mL/Hr) IV Continuous <Continuous>  dextrose 50% Injectable 25 Gram(s) IV Push once  diVALproex Sprinkle 125 milliGRAM(s) Oral three times a day  heparin  Injectable 5000 Unit(s) SubCutaneous every 8 hours  insulin lispro (HumaLOG) corrective regimen sliding scale   SubCutaneous three times a day before meals  levothyroxine 25 MICROGram(s) Oral daily  losartan 25 milliGRAM(s) Oral daily    MEDICATIONS  (PRN):      Allergies    No Known Allergies    Intolerances        REVIEW OF SYSTEMS:  * General: denies chills, fatigue  * Eyes: denies vision changes  * Respiratory: denies cough, SOB, wheezing  * Cardio: denies chest pain, palpitations  * GI: denies abd pain, nausea, vomiting, diarrhea  * : denies dysuria  * Neuro: denies headaches, numbness, weakness  * MSK: denies joint pain  * Skin: denies itching, rashes    Vital Signs Last 24 Hrs  T(C): 37.1 (21 Apr 2019 15:28), Max: 37.1 (20 Apr 2019 20:17)  T(F): 98.7 (21 Apr 2019 15:28), Max: 98.7 (20 Apr 2019 20:17)  HR: 69 (21 Apr 2019 15:28) (69 - 81)  BP: 159/61 (21 Apr 2019 15:28) (159/61 - 186/76)  BP(mean): --  RR: 16 (21 Apr 2019 15:28) (16 - 18)  SpO2: 95% (21 Apr 2019 15:28) (95% - 98%)    PHYSICAL EXAM:  * General: no acute distress, well-nourished, well-developed  * HEENT: atraumatic, normocephalic; moist mucus membranes; supple neck; no JVD  * CN: EOMI, PERRL  * Respiratory: cta b/l; no wheezes, rales, rhonchi  * Cardio: RRR; no appreciable murmurs, rubs, gallops  * Abd: soft, nontender, nondistended, +BS  * Neuro: AAOx3; strength 5/5; sensation intact throughout; no appreciable tremors   * Extremities: no clubbing, cyanosis, edema  * Skin: no rashes                         13.0   9.71  )-----------( 213      ( 20 Apr 2019 06:15 )             39.7     04-21    139  |  104  |  12  ----------------------------<  117<H>  3.7   |  30  |  1.01    Ca    8.0<L>      21 Apr 2019 05:55  Phos  3.6     04-20  Mg     1.9     04-20        POCT Blood Glucose.: 257 mg/dL (04.21.19 @ 16:52)    Culture - Urine (04.20.19 @ 23:26)    Specimen Source: .Urine    Culture Results:   >100,000 CFU/ml Gram Negative Rods    Urinalysis (04.20.19 @ 02:45)    pH Urine: 7.0    Glucose Qualitative, Urine: Negative    Blood, Urine: Small    Color: Yellow    Urine Appearance: Slightly Turbid    Bilirubin: Negative    Ketone - Urine: Negative    Specific Gravity: 1.005    Protein, Urine: 100    Urobilinogen: Negative    Nitrite: Negative    Leukocyte Esterase Concentration: Moderate    Urine Microscopic-Add On (NC) (04.20.19 @ 02:45)    Red Blood Cell - Urine: 2-5 /HPF    White Blood Cell - Urine: 11-25 /HPF    Bacteria: Many /HPF    Comment - Urine: Amorphous phosphate crystals seen    Epithelial Cells: Few /HPF      RADIOLOGY & ADDITIONAL TESTS:   no new imaging    Consultant(s) Notes Reviewed: Endocrinology

## 2019-04-21 NOTE — PROGRESS NOTE ADULT - PROBLEM SELECTOR PLAN 7
Has hx of HTN  - Increased losartan to 50 qd  - Decreased carvedilol to 6.25 bid due to hypoglycemia  - No CCB for now as patient has LE edema  - Start lasix 20 PO qd

## 2019-04-21 NOTE — PROGRESS NOTE ADULT - ASSESSMENT
87 yo Chinese M with PMH of DM2, HTN, hypothyroidism, depression, was admitted for hypoglycemia 2/2 excess medication administration.

## 2019-04-21 NOTE — CONSULT NOTE ADULT - ASSESSMENT
89 yo [place] M with PMH of DM2, HTN, hypothyroidism, depression, was brought to the ED due to being found unresponsive at the base of his staircase when his wife came home. Pt cannot give any additional details as to what happened, however EMS was called and found the pt to have a very low glucose level of 29. Pt admits to taking oral dm meds tid and ? insulin prn if fsg high.

## 2019-04-21 NOTE — CONSULT NOTE ADULT - PROBLEM SELECTOR RECOMMENDATION 2
good control with a1c>8  restart prandin 1mg tid upon d/c  consider metformin if fasting hyperglycemia  fsg ac and hs

## 2019-04-21 NOTE — PROGRESS NOTE ADULT - PROBLEM SELECTOR PLAN 1
P/w low blood glucose, symptomatic, found unconscious by wife  - May be 2/2 taking insulin after being told not to, may be due to poor po intake  - C/w D5 half NS  - Monitor fs q6h  - Only sliding scale for now  - Endocrine consult Dr Jules

## 2019-04-22 ENCOUNTER — TRANSCRIPTION ENCOUNTER (OUTPATIENT)
Age: 84
End: 2019-04-22

## 2019-04-22 DIAGNOSIS — R33.9 RETENTION OF URINE, UNSPECIFIED: ICD-10-CM

## 2019-04-22 LAB
-  AMIKACIN: SIGNIFICANT CHANGE UP
-  AMPICILLIN/SULBACTAM: SIGNIFICANT CHANGE UP
-  AMPICILLIN: SIGNIFICANT CHANGE UP
-  AZTREONAM: SIGNIFICANT CHANGE UP
-  CEFAZOLIN: SIGNIFICANT CHANGE UP
-  CEFEPIME: SIGNIFICANT CHANGE UP
-  CEFOXITIN: SIGNIFICANT CHANGE UP
-  CEFTRIAXONE: SIGNIFICANT CHANGE UP
-  CIPROFLOXACIN: SIGNIFICANT CHANGE UP
-  ERTAPENEM: SIGNIFICANT CHANGE UP
-  GENTAMICIN: SIGNIFICANT CHANGE UP
-  IMIPENEM: SIGNIFICANT CHANGE UP
-  LEVOFLOXACIN: SIGNIFICANT CHANGE UP
-  MEROPENEM: SIGNIFICANT CHANGE UP
-  NITROFURANTOIN: SIGNIFICANT CHANGE UP
-  PIPERACILLIN/TAZOBACTAM: SIGNIFICANT CHANGE UP
-  TIGECYCLINE: SIGNIFICANT CHANGE UP
-  TOBRAMYCIN: SIGNIFICANT CHANGE UP
-  TRIMETHOPRIM/SULFAMETHOXAZOLE: SIGNIFICANT CHANGE UP
ANION GAP SERPL CALC-SCNC: 6 MMOL/L — SIGNIFICANT CHANGE UP (ref 5–17)
APPEARANCE UR: CLEAR — SIGNIFICANT CHANGE UP
BILIRUB UR-MCNC: NEGATIVE — SIGNIFICANT CHANGE UP
BUN SERPL-MCNC: 14 MG/DL — SIGNIFICANT CHANGE UP (ref 7–18)
CALCIUM SERPL-MCNC: 8.2 MG/DL — LOW (ref 8.4–10.5)
CHLORIDE SERPL-SCNC: 103 MMOL/L — SIGNIFICANT CHANGE UP (ref 96–108)
CO2 SERPL-SCNC: 30 MMOL/L — SIGNIFICANT CHANGE UP (ref 22–31)
COLOR SPEC: YELLOW — SIGNIFICANT CHANGE UP
CREAT SERPL-MCNC: 1.04 MG/DL — SIGNIFICANT CHANGE UP (ref 0.5–1.3)
CULTURE RESULTS: SIGNIFICANT CHANGE UP
DIFF PNL FLD: NEGATIVE — SIGNIFICANT CHANGE UP
GLUCOSE SERPL-MCNC: 106 MG/DL — HIGH (ref 70–99)
GLUCOSE UR QL: NEGATIVE — SIGNIFICANT CHANGE UP
KETONES UR-MCNC: NEGATIVE — SIGNIFICANT CHANGE UP
LEUKOCYTE ESTERASE UR-ACNC: ABNORMAL
METHOD TYPE: SIGNIFICANT CHANGE UP
NITRITE UR-MCNC: POSITIVE
ORGANISM # SPEC MICROSCOPIC CNT: SIGNIFICANT CHANGE UP
ORGANISM # SPEC MICROSCOPIC CNT: SIGNIFICANT CHANGE UP
PH UR: 6 — SIGNIFICANT CHANGE UP (ref 5–8)
POTASSIUM SERPL-MCNC: 4.3 MMOL/L — SIGNIFICANT CHANGE UP (ref 3.5–5.3)
POTASSIUM SERPL-SCNC: 4.3 MMOL/L — SIGNIFICANT CHANGE UP (ref 3.5–5.3)
PROT UR-MCNC: 15
SODIUM SERPL-SCNC: 139 MMOL/L — SIGNIFICANT CHANGE UP (ref 135–145)
SP GR SPEC: 1.01 — SIGNIFICANT CHANGE UP (ref 1.01–1.02)
SPECIMEN SOURCE: SIGNIFICANT CHANGE UP
UROBILINOGEN FLD QL: NEGATIVE — SIGNIFICANT CHANGE UP

## 2019-04-22 PROCEDURE — 99233 SBSQ HOSP IP/OBS HIGH 50: CPT | Mod: GC

## 2019-04-22 RX ORDER — ATORVASTATIN CALCIUM 80 MG/1
1 TABLET, FILM COATED ORAL
Qty: 30 | Refills: 0 | OUTPATIENT
Start: 2019-04-22 | End: 2019-05-21

## 2019-04-22 RX ORDER — LOSARTAN POTASSIUM 100 MG/1
100 TABLET, FILM COATED ORAL DAILY
Qty: 0 | Refills: 0 | Status: DISCONTINUED | OUTPATIENT
Start: 2019-04-22 | End: 2019-04-24

## 2019-04-22 RX ORDER — LOSARTAN POTASSIUM 100 MG/1
1 TABLET, FILM COATED ORAL
Qty: 30 | Refills: 0 | OUTPATIENT
Start: 2019-04-22 | End: 2019-05-21

## 2019-04-22 RX ORDER — CARVEDILOL PHOSPHATE 80 MG/1
1 CAPSULE, EXTENDED RELEASE ORAL
Qty: 0 | Refills: 0 | COMMUNITY

## 2019-04-22 RX ORDER — TAMSULOSIN HYDROCHLORIDE 0.4 MG/1
0.4 CAPSULE ORAL AT BEDTIME
Qty: 0 | Refills: 0 | Status: DISCONTINUED | OUTPATIENT
Start: 2019-04-22 | End: 2019-04-24

## 2019-04-22 RX ORDER — REPAGLINIDE 1 MG/1
1 TABLET ORAL
Qty: 0 | Refills: 0 | COMMUNITY

## 2019-04-22 RX ORDER — DIVALPROEX SODIUM 500 MG/1
1 TABLET, DELAYED RELEASE ORAL
Qty: 0 | Refills: 0 | COMMUNITY

## 2019-04-22 RX ORDER — ATORVASTATIN CALCIUM 80 MG/1
1 TABLET, FILM COATED ORAL
Qty: 0 | Refills: 0 | COMMUNITY

## 2019-04-22 RX ORDER — AMLODIPINE BESYLATE 2.5 MG/1
5 TABLET ORAL DAILY
Qty: 0 | Refills: 0 | Status: DISCONTINUED | OUTPATIENT
Start: 2019-04-22 | End: 2019-04-22

## 2019-04-22 RX ORDER — CARVEDILOL PHOSPHATE 80 MG/1
1 CAPSULE, EXTENDED RELEASE ORAL
Qty: 60 | Refills: 0 | OUTPATIENT
Start: 2019-04-22 | End: 2019-05-21

## 2019-04-22 RX ORDER — AMLODIPINE BESYLATE 2.5 MG/1
5 TABLET ORAL ONCE
Qty: 0 | Refills: 0 | Status: COMPLETED | OUTPATIENT
Start: 2019-04-22 | End: 2019-04-22

## 2019-04-22 RX ORDER — REPAGLINIDE 1 MG/1
1 TABLET ORAL
Qty: 90 | Refills: 0 | OUTPATIENT
Start: 2019-04-22 | End: 2019-05-21

## 2019-04-22 RX ORDER — LEVOTHYROXINE SODIUM 125 MCG
1 TABLET ORAL
Qty: 30 | Refills: 0 | OUTPATIENT
Start: 2019-04-22 | End: 2019-05-21

## 2019-04-22 RX ORDER — LEVOTHYROXINE SODIUM 125 MCG
1 TABLET ORAL
Qty: 0 | Refills: 0 | COMMUNITY

## 2019-04-22 RX ORDER — FUROSEMIDE 40 MG
1 TABLET ORAL
Qty: 30 | Refills: 0 | OUTPATIENT
Start: 2019-04-22 | End: 2019-05-21

## 2019-04-22 RX ORDER — DIVALPROEX SODIUM 500 MG/1
1 TABLET, DELAYED RELEASE ORAL
Qty: 90 | Refills: 0 | OUTPATIENT
Start: 2019-04-22 | End: 2019-05-21

## 2019-04-22 RX ADMIN — CARVEDILOL PHOSPHATE 6.25 MILLIGRAM(S): 80 CAPSULE, EXTENDED RELEASE ORAL at 17:58

## 2019-04-22 RX ADMIN — HEPARIN SODIUM 5000 UNIT(S): 5000 INJECTION INTRAVENOUS; SUBCUTANEOUS at 06:24

## 2019-04-22 RX ADMIN — LOSARTAN POTASSIUM 100 MILLIGRAM(S): 100 TABLET, FILM COATED ORAL at 17:58

## 2019-04-22 RX ADMIN — Medication 20 MILLIGRAM(S): at 06:24

## 2019-04-22 RX ADMIN — Medication 3: at 12:06

## 2019-04-22 RX ADMIN — TAMSULOSIN HYDROCHLORIDE 0.4 MILLIGRAM(S): 0.4 CAPSULE ORAL at 21:59

## 2019-04-22 RX ADMIN — Medication 25 MICROGRAM(S): at 06:24

## 2019-04-22 RX ADMIN — DIVALPROEX SODIUM 125 MILLIGRAM(S): 500 TABLET, DELAYED RELEASE ORAL at 06:24

## 2019-04-22 RX ADMIN — DIVALPROEX SODIUM 125 MILLIGRAM(S): 500 TABLET, DELAYED RELEASE ORAL at 22:00

## 2019-04-22 RX ADMIN — LOSARTAN POTASSIUM 50 MILLIGRAM(S): 100 TABLET, FILM COATED ORAL at 06:24

## 2019-04-22 RX ADMIN — CARVEDILOL PHOSPHATE 6.25 MILLIGRAM(S): 80 CAPSULE, EXTENDED RELEASE ORAL at 06:24

## 2019-04-22 RX ADMIN — ATORVASTATIN CALCIUM 20 MILLIGRAM(S): 80 TABLET, FILM COATED ORAL at 21:59

## 2019-04-22 RX ADMIN — HEPARIN SODIUM 5000 UNIT(S): 5000 INJECTION INTRAVENOUS; SUBCUTANEOUS at 14:34

## 2019-04-22 RX ADMIN — DIVALPROEX SODIUM 125 MILLIGRAM(S): 500 TABLET, DELAYED RELEASE ORAL at 14:33

## 2019-04-22 RX ADMIN — AMLODIPINE BESYLATE 5 MILLIGRAM(S): 2.5 TABLET ORAL at 10:22

## 2019-04-22 RX ADMIN — HEPARIN SODIUM 5000 UNIT(S): 5000 INJECTION INTRAVENOUS; SUBCUTANEOUS at 22:00

## 2019-04-22 NOTE — PROGRESS NOTE ADULT - PROBLEM SELECTOR PLAN 1
P/w low blood glucose, symptomatic, found unconscious by wife  - May be 2/2 taking insulin after being told not to, may be due to poor po intake  - Only sliding scale for now  - Endocrine consult Dr Jules: resume prandin on Dc, DC insulin,   -recommend to follow up OP to start metformin if elevated FS

## 2019-04-22 NOTE — DIETITIAN INITIAL EVALUATION ADULT. - OTHER INFO
Patient visited for consult. While ending consult, asking wife and patient if they have any questions patient's wife reported patient stated "he wants to kill himself." Immediately spoke with RN, RN relayed information to MD. Patient admitted for hypoglycemia. Patient has history of HTN, HLD, DM, and depression. Patient currently on DASH/TLC  and Consistent Carbohydrate diet. As per chart review patient was recommended stop insulin regimen, but patient continued. Patient denied any weight loss, abdominal pain, and chewing or swallowing difficulty. patient presents with 3+ edema in left leg and right leg. Skin intact.

## 2019-04-22 NOTE — PROGRESS NOTE ADULT - SUBJECTIVE AND OBJECTIVE BOX
Interval Events:      Allergies    No Known Allergies    Intolerances      Endocrine/Metabolic Medications:  atorvastatin 20 milliGRAM(s) Oral at bedtime  dextrose 50% Injectable 25 Gram(s) IV Push once  insulin lispro (HumaLOG) corrective regimen sliding scale   SubCutaneous three times a day before meals  levothyroxine 25 MICROGram(s) Oral daily      Vital Signs Last 24 Hrs  T(C): 37.1 (22 Apr 2019 16:13), Max: 37.2 (22 Apr 2019 05:54)  T(F): 98.7 (22 Apr 2019 16:13), Max: 98.9 (22 Apr 2019 05:54)  HR: 74 (22 Apr 2019 16:13) (68 - 74)  BP: 151/64 (22 Apr 2019 16:13) (133/59 - 180/76)  BP(mean): --  RR: 18 (22 Apr 2019 16:13) (16 - 18)  SpO2: 100% (22 Apr 2019 16:13) (96% - 100%)      PHYSICAL EXAM  All physical exam findings normal, except those marked:  General:	Alert, active, cooperative, NAD, well hydrated  .		[] Abnormal:  Neck		Normal: supple, no cervical adenopathy, no palpable thyroid  .		[] Abnormal:  Cardiovascular	Normal: regular rate, normal S1, S2, no murmurs  .		[] Abnormal:  Respiratory	Normal: no chest wall deformity, normal respiratory pattern, CTA B/L  .		[] Abnormal:  Abdominal	Normal: soft, ND, NT, bowel sounds present, no masses, no organomegaly  .		[] Abnormal:  		Normal normal genitalia, testes descended, circumcised/uncircumcised  .		Nati stage:			Breast nati:  .		Menstrual history:  .		[] Abnormal:  Extremities	Normal: FROM x4  .		[] Abnormal:  Skin		Normal: intact and not indurated, no rash, no acanthosis nigricans  .		[] Abnormal:  Neurologic	Normal: grossly intact  .		[] Abnormal:    LABS                              139    |  103    |  14                  Calcium: 8.2   / iCa: x      (04-22 @ 07:37)    ----------------------------<  106       Magnesium: x                                4.3     |  30     |  1.04             Phosphorous: x          CAPILLARY BLOOD GLUCOSE      POCT Blood Glucose.: 155 mg/dL (22 Apr 2019 16:35)  POCT Blood Glucose.: 282 mg/dL (22 Apr 2019 11:54)  POCT Blood Glucose.: 111 mg/dL (22 Apr 2019 08:52)  POCT Blood Glucose.: 165 mg/dL (21 Apr 2019 21:06)        Assesment/plan Interval Events:  pt in nad    Allergies    No Known Allergies    Intolerances      Endocrine/Metabolic Medications:  atorvastatin 20 milliGRAM(s) Oral at bedtime  dextrose 50% Injectable 25 Gram(s) IV Push once  insulin lispro (HumaLOG) corrective regimen sliding scale   SubCutaneous three times a day before meals  levothyroxine 25 MICROGram(s) Oral daily      Vital Signs Last 24 Hrs  T(C): 37.1 (22 Apr 2019 16:13), Max: 37.2 (22 Apr 2019 05:54)  T(F): 98.7 (22 Apr 2019 16:13), Max: 98.9 (22 Apr 2019 05:54)  HR: 74 (22 Apr 2019 16:13) (68 - 74)  BP: 151/64 (22 Apr 2019 16:13) (133/59 - 180/76)  BP(mean): --  RR: 18 (22 Apr 2019 16:13) (16 - 18)  SpO2: 100% (22 Apr 2019 16:13) (96% - 100%)      PHYSICAL EXAM  All physical exam findings normal, except those marked:  General:	Alert, active, cooperative, NAD, well hydrated  .		[] Abnormal:  Neck		Normal: supple, no cervical adenopathy, no palpable thyroid  .		[] Abnormal:  Cardiovascular	Normal: regular rate, normal S1, S2, no murmurs  .		[] Abnormal:  Respiratory	Normal: no chest wall deformity, normal respiratory pattern, CTA B/L  .		[] Abnormal:  Abdominal	Normal: soft, ND, NT, bowel sounds present, no masses, no organomegaly  .		[] Abnormal:  		Normal normal genitalia, testes descended, circumcised/uncircumcised  .		Nati stage:			Breast nati:  .		Menstrual history:  .		[] Abnormal:  Extremities	Normal: FROM x4  .		[] Abnormal:  Skin		Normal: intact and not indurated, no rash, no acanthosis nigricans  .		[] Abnormal:  Neurologic	Normal: grossly intact  .		[] Abnormal:    LABS                              139    |  103    |  14                  Calcium: 8.2   / iCa: x      (04-22 @ 07:37)    ----------------------------<  106       Magnesium: x                                4.3     |  30     |  1.04             Phosphorous: x          CAPILLARY BLOOD GLUCOSE      POCT Blood Glucose.: 155 mg/dL (22 Apr 2019 16:35)  POCT Blood Glucose.: 282 mg/dL (22 Apr 2019 11:54)  POCT Blood Glucose.: 111 mg/dL (22 Apr 2019 08:52)  POCT Blood Glucose.: 165 mg/dL (21 Apr 2019 21:06)        Assesment/plan  Dm- s/p hypoglycemia  resolved  start prandin 1mg ac tid upon d/c  d/w pt and family to avoid insulin use   fsg ac and hs  d/c planning

## 2019-04-22 NOTE — PROGRESS NOTE ADULT - PROBLEM SELECTOR PLAN 9
C/w divalproex at home dose c/w Levoflox   follow up Urine culture / sensitivity   - will repeat UA    Heparin for DVT prophylaxis

## 2019-04-22 NOTE — PROGRESS NOTE ADULT - PROBLEM SELECTOR PLAN 3
B/l LE edema, had some SOB on admission  - BUN/Cr wnl, may be 2/2 CHF  - Denies current SOB  - Lungs cta b/l without crackles  - Started lasix 20 qd  - F/u echo: nornal EF, I DD

## 2019-04-22 NOTE — DISCHARGE NOTE PROVIDER - NSDCCPCAREPLAN_GEN_ALL_CORE_FT
PRINCIPAL DISCHARGE DIAGNOSIS  Diagnosis: Hypoglycemia  Assessment and Plan of Treatment: You presented with hypoglycemia. with blood sugar of 29. Hypoglycemia was due to excess medication administration. You received IV D5. You are recommended to discontinue taking insulina and continue with Prandin as advised. You are recommended to follow up with PCP  for starting metformin if elevated fasting sugrs. Your A1c: 8.      SECONDARY DISCHARGE DIAGNOSES  Diagnosis: Urinary tract infection  Assessment and Plan of Treatment: For UTI , your received levofloxacin, You are recommended to take medication as advised and followup with PCP    Diagnosis: Lower extremity edema  Assessment and Plan of Treatment: Your lower extremity edema is most likely due to venous insufficiency, Your ECHO showed  normal EF with I DD. You are recommended take PO lasix 40 mg and follow up with PCP.    Diagnosis: Hypothyroid  Assessment and Plan of Treatment: You are recommended to take medication as advised and follow up with PCP    Diagnosis: HTN (hypertension)  Assessment and Plan of Treatment: You are recommended to maintain compliance with medication, take DASH diet and followup with PCP, Your Losartan dose is increased to 100 mg once a day and Metoprolol dose is decreased. You are also started on Lasix for HTN and Lower extremity edema.    Diagnosis: Urinary retention  Assessment and Plan of Treatment: You were retaining upto 750 cc of urine . Therefore meza was placed. You are also started on Flomax. Trial of void is recommended in 1 week at [facility] and urology follow up is recommended

## 2019-04-22 NOTE — DISCHARGE NOTE PROVIDER - HOSPITAL COURSE
89 yo [place] M with PMH of DM2, HTN, hypothyroidism, depression, P/w low blood glucose, symptomatic, found unconscious by wife, BS in 29 and was admitted for hypoglycemia 2/2 excess medication administration. Patient received IV d5 , endo consulted, recommended to DC insulin and continue with Prandin. recommended OP follow up for starting metformin if elevated FS, Hb A1c: 8. For LE edema, : lasix 20 Po started. EF: normal with I DD. Edema could be due to venous insufficiency. gait disturbance: weakness, PT recommended [facility], patient refused.  will set up home services. Pressure ulcer on back due to immobility. HTN BB dose decreased, Losartan increased to 100 mg , Urine retention: started on Flomax and meza cath. Hyperthyroidism medication and depression medications continued 89 yo [place] M with PMH of DM2, HTN, hypothyroidism, depression, P/w low blood glucose, symptomatic, found unconscious by wife, BS in 29 and was admitted for hypoglycemia 2/2 excess medication administration. Patient received IV d5 , endo consulted, recommended to DC insulin and continue with Prandin. recommended OP follow up for starting metformin if elevated FS, Hb A1c: 8. For LE edema, : lasix 40 Po started. EF: normal with I DD. Edema could be due to venous insufficiency. gait disturbance: weakness, PT recommended EDGAR. Pressure ulcer on back due to immobility. HTN BB dose decreased, Losartan increased to 100 mg , lasix 40 . For  Urine retention: started on Flomax and meza cath. TOV in EDGAR. Hyperthyroidism medication and depression medications continued.     Patient is medially stable for discharge. Case is discussed with the attending

## 2019-04-22 NOTE — DISCHARGE NOTE PROVIDER - PROVIDER TOKENS
PROVIDER:[TOKEN:[[id number]]],PROVIDER:[TOKEN:[[id number]]],PROVIDER:[TOKEN:[[id number]]]

## 2019-04-22 NOTE — DISCHARGE NOTE PROVIDER - CARE PROVIDER_API CALL
Parag Goncalves)  Urology  [address], [address] [place] [address]  Phone: [phone]  Fax: [phone]  Follow Up Time:     Gaviota Knutson; MPH)  ColonRectal Surgery; Preventive Medicine  [address] [place] [address]  Phone: [phone]  Fax: [phone]  Follow Up Time:     Laxmi Jules)  EndocrinologyMetabDiabetes  [address] [place] [address]  Phone: [phone]  Fax: [phone]  Follow Up Time:

## 2019-04-22 NOTE — PROGRESS NOTE ADULT - SUBJECTIVE AND OBJECTIVE BOX
PGY 1 Note discussed with supervising resident and primary attending    Patient is a 88y old  Male who presents with a chief complaint of hypoglycemia (21 Apr 2019 19:59)      INTERVAL HPI/OVERNIGHT EVENTS:   Patient seen and examined at the bedside.       MEDICATIONS  (STANDING):  atorvastatin 20 milliGRAM(s) Oral at bedtime  carvedilol 6.25 milliGRAM(s) Oral every 12 hours  dextrose 50% Injectable 25 Gram(s) IV Push once  diVALproex Sprinkle 125 milliGRAM(s) Oral three times a day  furosemide    Tablet 20 milliGRAM(s) Oral daily  heparin  Injectable 5000 Unit(s) SubCutaneous every 8 hours  insulin lispro (HumaLOG) corrective regimen sliding scale   SubCutaneous three times a day before meals  levoFLOXacin  Tablet 500 milliGRAM(s) Oral every 24 hours  levothyroxine 25 MICROGram(s) Oral daily  losartan 100 milliGRAM(s) Oral daily    MEDICATIONS  (PRN):      __________________________________________________  REVIEW OF SYSTEMS:    CONSTITUTIONAL: No fever,   EYES: no acute visual disturbances  NECK: No pain or stiffness  RESPIRATORY: No cough; No shortness of breath  CARDIOVASCULAR: No chest pain, no palpitations  GASTROINTESTINAL: No pain. No nausea or vomiting; No diarrhea   NEUROLOGICAL: No headache or numbness, no tremors  MUSCULOSKELETAL: No joint pain, no muscle pain  GENITOURINARY: no dysuria, no frequency, no hesitancy  PSYCHIATRY: no depression , no anxiety  ALL OTHER  ROS negative        Vital Signs Last 24 Hrs  T(C): 36.7 (22 Apr 2019 07:59), Max: 37.2 (22 Apr 2019 05:54)  T(F): 98 (22 Apr 2019 07:59), Max: 98.9 (22 Apr 2019 05:54)  HR: 73 (22 Apr 2019 10:00) (68 - 73)  BP: 133/59 (22 Apr 2019 10:00) (133/59 - 180/76)  BP(mean): --  RR: 18 (22 Apr 2019 07:59) (16 - 18)  SpO2: 100% (22 Apr 2019 10:00) (95% - 100%)    ________________________________________________  PHYSICAL EXAM:  GENERAL: NAD  HEENT: Normocephalic;  conjunctivae and sclerae clear; moist mucous membranes;   NECK : supple  CHEST/LUNG: Clear to auscultation bilaterally with good air entry   HEART: S1 S2  regular; no murmurs, gallops or rubs  ABDOMEN: Soft, Nontender, Nondistended; Bowel sounds present  EXTREMITIES: no cyanosis; no edema; no calf tenderness  SKIN: warm and dry; no rash  NERVOUS SYSTEM:  Awake and alert; Oriented  to place, person and time ; no new deficits    _________________________________________________  LABS:    04-22    139  |  103  |  14  ----------------------------<  106<H>  4.3   |  30  |  1.04    Ca    8.2<L>      22 Apr 2019 07:37          CAPILLARY BLOOD GLUCOSE      POCT Blood Glucose.: 282 mg/dL (22 Apr 2019 11:54)  POCT Blood Glucose.: 111 mg/dL (22 Apr 2019 08:52)  POCT Blood Glucose.: 165 mg/dL (21 Apr 2019 21:06)  POCT Blood Glucose.: 257 mg/dL (21 Apr 2019 16:52)        RADIOLOGY & ADDITIONAL TESTS:    Imaging Personally Reviewed:  YES/NO    Consultant(s) Notes Reviewed:   YES/ No    Care Discussed with Consultants :     Plan of care was discussed with patient and /or primary care giver; all questions and concerns were addressed and care was aligned with patient's wishes. PGY 1 Note discussed with supervising resident and primary attending    Patient is a 88y old  Male who presents with a chief complaint of hypoglycemia (21 Apr 2019 19:59)      INTERVAL HPI/OVERNIGHT EVENTS:   Patient seen and examined at the bedside.   c/w levoflox, urine c/s : pending   Will repeat UA  Patient retaining urine: 750 cc, Flomax started   Aguirre     MEDICATIONS  (STANDING):  atorvastatin 20 milliGRAM(s) Oral at bedtime  carvedilol 6.25 milliGRAM(s) Oral every 12 hours  dextrose 50% Injectable 25 Gram(s) IV Push once  diVALproex Sprinkle 125 milliGRAM(s) Oral three times a day  furosemide    Tablet 20 milliGRAM(s) Oral daily  heparin  Injectable 5000 Unit(s) SubCutaneous every 8 hours  insulin lispro (HumaLOG) corrective regimen sliding scale   SubCutaneous three times a day before meals  levoFLOXacin  Tablet 500 milliGRAM(s) Oral every 24 hours  levothyroxine 25 MICROGram(s) Oral daily  losartan 100 milliGRAM(s) Oral daily    MEDICATIONS  (PRN):      __________________________________________________  REVIEW OF SYSTEMS:    CONSTITUTIONAL: No fever,   EYES: no acute visual disturbances  NECK: No pain or stiffness  RESPIRATORY: No cough; No shortness of breath  CARDIOVASCULAR: No chest pain, no palpitations  GASTROINTESTINAL: No pain. No nausea or vomiting; No diarrhea   NEUROLOGICAL: No headache or numbness, no tremors  MUSCULOSKELETAL: No joint pain, no muscle pain  GENITOURINARY: no dysuria, no frequency, no hesitancy  PSYCHIATRY: no depression , no anxiety  ALL OTHER  ROS negative        Vital Signs Last 24 Hrs  T(C): 36.7 (22 Apr 2019 07:59), Max: 37.2 (22 Apr 2019 05:54)  T(F): 98 (22 Apr 2019 07:59), Max: 98.9 (22 Apr 2019 05:54)  HR: 73 (22 Apr 2019 10:00) (68 - 73)  BP: 133/59 (22 Apr 2019 10:00) (133/59 - 180/76)  BP(mean): --  RR: 18 (22 Apr 2019 07:59) (16 - 18)  SpO2: 100% (22 Apr 2019 10:00) (95% - 100%)    ________________________________________________  PHYSICAL EXAM:  GENERAL:male in bed in no acute distress   HEENT: Normocephalic;  conjunctivae and sclerae clear; moist mucous membranes;   NECK : supple  CHEST/LUNG: Clear to auscultation bilaterally with good air entry   HEART: S1 S2  regular; no murmurs, gallops or rubs  ABDOMEN: Soft, Nontender, Nondistended; Bowel sounds present  EXTREMITIES: no cyanosis; no edema; no calf tenderness  SKIN: warm and dry; no rash  NERVOUS SYSTEM:  Awake and alert;     _________________________________________________  LABS:    04-22    139  |  103  |  14  ----------------------------<  106<H>  4.3   |  30  |  1.04    Ca    8.2<L>      22 Apr 2019 07:37          CAPILLARY BLOOD GLUCOSE      POCT Blood Glucose.: 282 mg/dL (22 Apr 2019 11:54)  POCT Blood Glucose.: 111 mg/dL (22 Apr 2019 08:52)  POCT Blood Glucose.: 165 mg/dL (21 Apr 2019 21:06)  POCT Blood Glucose.: 257 mg/dL (21 Apr 2019 16:52)        RADIOLOGY & ADDITIONAL TESTS:    Imaging Personally Reviewed:  YES/    Consultant(s) Notes Reviewed:   YES/    Care Discussed with Consultants :     Plan of care was discussed with patient and /or primary care giver; all questions and concerns were addressed and care was aligned with patient's wishes.

## 2019-04-22 NOTE — PROGRESS NOTE ADULT - ASSESSMENT
89 yo [place] M with PMH of DM2, HTN, hypothyroidism, depression, was admitted for hypoglycemia 2/2 excess medication administration.

## 2019-04-22 NOTE — PROGRESS NOTE ADULT - PROBLEM SELECTOR PLAN 7
Has hx of HTN  - Increased losartan to 100 qd  - Decreased carvedilol to 6.25 bid due to hypoglycemia  -received 5 mg of Amlodipine in AM due to elevated BP   - c/w  lasix 20 PO qd C/w synthroid 25mcg  - TSH wnl

## 2019-04-22 NOTE — DIETITIAN INITIAL EVALUATION ADULT. - PERTINENT LABORATORY DATA
Reviewed. 04-22 Na139 mmol/L Glu 106 mg/dL<H> K+ 4.3 mmol/L Cr  1.04 mg/dL BUN 14 mg/dL 04-20 Phos 3.6 mg/dL 04-19 Alb 3.7 g/dL 04-20 [id number] 8.4 %<H> 04-20 Chol 99 mg/dL LDL 39 mg/dL HDL 49 mg/dL Trig 55 mg/dL

## 2019-04-22 NOTE — DIETITIAN INITIAL EVALUATION ADULT. - PROBLEM SELECTOR PLAN 4
grandchildren and wife do not know which medication he is currently on  would avoid calcium channel blockers for now in setting of bilateral LE edema

## 2019-04-22 NOTE — PROGRESS NOTE ADULT - PROBLEM SELECTOR PLAN 6
C/w lipitor Has hx of HTN  - Increased losartan to 100 qd  - Decreased carvedilol to 6.25 bid due to hypoglycemia  -received 5 mg of Amlodipine in AM due to elevated BP   - c/w  lasix 20 PO qd

## 2019-04-22 NOTE — DIETITIAN INITIAL EVALUATION ADULT. - NS AS NUTRI INTERV COLLABORAT
Collaboration with other providers/patient's wife reported patient stated "he wants to kill himself." Immediately spoke with RN, RN relayed information to MD.

## 2019-04-22 NOTE — PROGRESS NOTE ADULT - PROBLEM SELECTOR PLAN 2
Hx of DM on repaglinide  - Insulin previously dc but continued taking at home  - Sliding scale only for now  - Fs q6h  - F/u A1c: 8  - resume prandin on Dc, DC insulin,   -recommend to follow up OP to start metformin if elevated FS   - Endo consult Dr Jules: noted

## 2019-04-22 NOTE — PROGRESS NOTE ADULT - PROBLEM SELECTOR PLAN 10
c/w Levoflox   follow up Urine culture / sensitivity   - will repeat UA    Heparin for DVT prophylaxis 750 ml residual urine volume.  Will place Aguirre and start alpha blocker

## 2019-04-23 LAB
ANION GAP SERPL CALC-SCNC: 5 MMOL/L — SIGNIFICANT CHANGE UP (ref 5–17)
BUN SERPL-MCNC: 16 MG/DL — SIGNIFICANT CHANGE UP (ref 7–18)
CALCIUM SERPL-MCNC: 8.3 MG/DL — LOW (ref 8.4–10.5)
CHLORIDE SERPL-SCNC: 105 MMOL/L — SIGNIFICANT CHANGE UP (ref 96–108)
CO2 SERPL-SCNC: 30 MMOL/L — SIGNIFICANT CHANGE UP (ref 22–31)
CREAT SERPL-MCNC: 1.17 MG/DL — SIGNIFICANT CHANGE UP (ref 0.5–1.3)
GLUCOSE SERPL-MCNC: 113 MG/DL — HIGH (ref 70–99)
HCT VFR BLD CALC: 37.7 % — LOW (ref 39–50)
HGB BLD-MCNC: 12.4 G/DL — LOW (ref 13–17)
MCHC RBC-ENTMCNC: 23.8 PG — LOW (ref 27–34)
MCHC RBC-ENTMCNC: 32.9 GM/DL — SIGNIFICANT CHANGE UP (ref 32–36)
MCV RBC AUTO: 72.2 FL — LOW (ref 80–100)
NRBC # BLD: 0 /100 WBCS — SIGNIFICANT CHANGE UP (ref 0–0)
PLATELET # BLD AUTO: 233 K/UL — SIGNIFICANT CHANGE UP (ref 150–400)
POTASSIUM SERPL-MCNC: 4.1 MMOL/L — SIGNIFICANT CHANGE UP (ref 3.5–5.3)
POTASSIUM SERPL-SCNC: 4.1 MMOL/L — SIGNIFICANT CHANGE UP (ref 3.5–5.3)
RBC # BLD: 5.22 M/UL — SIGNIFICANT CHANGE UP (ref 4.2–5.8)
RBC # FLD: 14.7 % — HIGH (ref 10.3–14.5)
SODIUM SERPL-SCNC: 140 MMOL/L — SIGNIFICANT CHANGE UP (ref 135–145)
WBC # BLD: 8.41 K/UL — SIGNIFICANT CHANGE UP (ref 3.8–10.5)
WBC # FLD AUTO: 8.41 K/UL — SIGNIFICANT CHANGE UP (ref 3.8–10.5)

## 2019-04-23 PROCEDURE — 99233 SBSQ HOSP IP/OBS HIGH 50: CPT | Mod: GC

## 2019-04-23 RX ORDER — ALBUTEROL 90 UG/1
1 AEROSOL, METERED ORAL EVERY 6 HOURS
Qty: 0 | Refills: 0 | Status: DISCONTINUED | OUTPATIENT
Start: 2019-04-23 | End: 2019-04-24

## 2019-04-23 RX ORDER — GABAPENTIN 400 MG/1
100 CAPSULE ORAL
Qty: 0 | Refills: 0 | Status: DISCONTINUED | OUTPATIENT
Start: 2019-04-23 | End: 2019-04-24

## 2019-04-23 RX ADMIN — HEPARIN SODIUM 5000 UNIT(S): 5000 INJECTION INTRAVENOUS; SUBCUTANEOUS at 05:36

## 2019-04-23 RX ADMIN — CARVEDILOL PHOSPHATE 6.25 MILLIGRAM(S): 80 CAPSULE, EXTENDED RELEASE ORAL at 17:00

## 2019-04-23 RX ADMIN — DIVALPROEX SODIUM 125 MILLIGRAM(S): 500 TABLET, DELAYED RELEASE ORAL at 22:04

## 2019-04-23 RX ADMIN — DIVALPROEX SODIUM 125 MILLIGRAM(S): 500 TABLET, DELAYED RELEASE ORAL at 13:55

## 2019-04-23 RX ADMIN — ATORVASTATIN CALCIUM 20 MILLIGRAM(S): 80 TABLET, FILM COATED ORAL at 22:04

## 2019-04-23 RX ADMIN — Medication 1: at 16:57

## 2019-04-23 RX ADMIN — HEPARIN SODIUM 5000 UNIT(S): 5000 INJECTION INTRAVENOUS; SUBCUTANEOUS at 22:04

## 2019-04-23 RX ADMIN — GABAPENTIN 100 MILLIGRAM(S): 400 CAPSULE ORAL at 17:00

## 2019-04-23 RX ADMIN — HEPARIN SODIUM 5000 UNIT(S): 5000 INJECTION INTRAVENOUS; SUBCUTANEOUS at 13:55

## 2019-04-23 RX ADMIN — TAMSULOSIN HYDROCHLORIDE 0.4 MILLIGRAM(S): 0.4 CAPSULE ORAL at 22:04

## 2019-04-23 NOTE — PROGRESS NOTE ADULT - ASSESSMENT
87 yo [place] M with PMH of DM2, HTN, hypothyroidism, depression, was admitted for hypoglycemia 2/2 excess medication administration.

## 2019-04-23 NOTE — PROGRESS NOTE ADULT - SUBJECTIVE AND OBJECTIVE BOX
Interval Events:  pt in nad    Allergies    No Known Allergies    Intolerances      Endocrine/Metabolic Medications:  atorvastatin 20 milliGRAM(s) Oral at bedtime  dextrose 50% Injectable 25 Gram(s) IV Push once  insulin lispro (HumaLOG) corrective regimen sliding scale   SubCutaneous three times a day before meals  levothyroxine 25 MICROGram(s) Oral daily      Vital Signs Last 24 Hrs  T(C): 36.9 (23 Apr 2019 08:23), Max: 37.1 (22 Apr 2019 16:13)  T(F): 98.5 (23 Apr 2019 08:23), Max: 98.7 (22 Apr 2019 16:13)  HR: 89 (23 Apr 2019 08:23) (74 - 89)  BP: 152/66 (23 Apr 2019 08:23) (150/64 - 155/72)  BP(mean): --  RR: 17 (23 Apr 2019 08:23) (17 - 18)  SpO2: 98% (23 Apr 2019 08:23) (98% - 100%)      PHYSICAL EXAM  All physical exam findings normal, except those marked:  General:	Alert, active, cooperative, NAD, well hydrated  .		[] Abnormal:  Neck		Normal: supple, no cervical adenopathy, no palpable thyroid  .		[] Abnormal:  Cardiovascular	Normal: regular rate, normal S1, S2, no murmurs  .		[] Abnormal:  Respiratory	Normal: no chest wall deformity, normal respiratory pattern, CTA B/L  .		[] Abnormal:  Abdominal	Normal: soft, ND, NT, bowel sounds present, no masses, no organomegaly  .		[] Abnormal:  		Normal normal genitalia, testes descended, circumcised/uncircumcised  .		Nati stage:			Breast nati:  .		Menstrual history:  .		[] Abnormal:  Extremities	Normal: FROM x4  .		[] Abnormal:  Skin		Normal: intact and not indurated, no rash, no acanthosis nigricans  .		[] Abnormal:  Neurologic	Normal: grossly intact  .		[] Abnormal:    LABS                        12.4   8.41  )-----------( 233      ( 23 Apr 2019 08:09 )             37.7                               140    |  105    |  16                  Calcium: 8.3   / iCa: x      (04-23 @ 08:09)    ----------------------------<  113       Magnesium: x                                4.1     |  30     |  1.17             Phosphorous: x          CAPILLARY BLOOD GLUCOSE      POCT Blood Glucose.: 127 mg/dL (23 Apr 2019 12:00)  POCT Blood Glucose.: 109 mg/dL (23 Apr 2019 08:14)  POCT Blood Glucose.: 140 mg/dL (22 Apr 2019 21:20)  POCT Blood Glucose.: 155 mg/dL (22 Apr 2019 16:35)        Assesment/plan    Dm- s/p hypoglycemia  resolved  start prandin 1mg ac tid upon d/c  d/w pt and family to avoid insulin use   fsg ac and hs    hypothyroidism- stable on lt4 25  d/c planning

## 2019-04-23 NOTE — PROGRESS NOTE ADULT - PROBLEM SELECTOR PLAN 3
B/l LE edema, had some SOB on admission  - Denies current SOB  - Lungs cta b/l without crackles  - Started lasix 20 qd  - F/u echo: nornal EF, I DD, most likely due to  venous insufficiency

## 2019-04-23 NOTE — CONSULT NOTE ADULT - ASSESSMENT
88 year old male with urinary retention likely secondary to UTI and BPH, PMH DM, HTN, HLD    - continue meza catheter  - continue Flomax  - continue full Course of antibiotics for UTI, consider repeat urine culture  - consider TOV after one week when UTI resolved  - continue medical management  - discussed with Dr. Dash

## 2019-04-23 NOTE — CONSULT NOTE ADULT - SUBJECTIVE AND OBJECTIVE BOX
HPI:  87 yo Kellie DAVISON with PMH of DM2, HTN, hypothyroidism, depression, was brought to the ED due to being found unresponsive at the base of his staircase when his wife came home. Pt cannot give any additional details as to what happened, however EMS was called and found the pt to have a very low glucose level of 29. He received 1 amp of dextrose with improvement in mental status and brought to the ED. Of note, he had similar episode a few months back and was in [facility] for 8 days. On discharge, as per wife, he was told to stay off his insulin regimen. He has however continued to use insulin in addition to repaglinide. In addition, he had refused to follow up with his PCP in months. He changed to a new PCP (Whit) but has yet to see him. On admission, his glucose continues to fall below normal range despite glucose infusion intermittently. Grandchildren at bedside note that he has been having difficulty breathing especially on exertion, and that walking has been proving difficult for him lately due to chronic bilateral LE swelling. (2019 20:30)    Patient seen and examined at bedside for urology consult due to urinary retention. Per medical team patient was unable to void yesterday and a random bladder scan was done which showed a residual of 750 and prompted a meza catheter to be placed at bedside. On admission patient was found to have an E. coli UTI. Per family at bedside patient urinates frequently at home and only a little comes out each team. His urine has been dark and foul smelling. Denies history of meza catheter or being on any medication for his prostate.     PAST MEDICAL & SURGICAL HISTORY:  Hypothyroid  Depression  Hyperlipidemia  HTN (hypertension)  DM (diabetes mellitus)  S/P cataract extraction    Review of Systems: Contained within HPI    MEDICATIONS  (STANDING):  atorvastatin 20 milliGRAM(s) Oral at bedtime  carvedilol 6.25 milliGRAM(s) Oral every 12 hours  dextrose 50% Injectable 25 Gram(s) IV Push once  diVALproex Sprinkle 125 milliGRAM(s) Oral three times a day  furosemide    Tablet 20 milliGRAM(s) Oral daily  gabapentin 100 milliGRAM(s) Oral two times a day  heparin  Injectable 5000 Unit(s) SubCutaneous every 8 hours  insulin lispro (HumaLOG) corrective regimen sliding scale   SubCutaneous three times a day before meals  levoFLOXacin  Tablet 500 milliGRAM(s) Oral every 24 hours  levothyroxine 25 MICROGram(s) Oral daily  losartan 100 milliGRAM(s) Oral daily  tamsulosin 0.4 milliGRAM(s) Oral at bedtime    MEDICATIONS  (PRN):  ALBUTerol    90 MICROgram(s) HFA Inhaler 1 Puff(s) Inhalation every 6 hours PRN Bronchospasm    Allergies: No Known Allergies    FAMILY HISTORY:  No pertinent family history in first degree relatives    Vital Signs Last 24 Hrs  T(C): 36.9 (2019 08:23), Max: 37.1 (2019 16:13)  T(F): 98.5 (2019 08:23), Max: 98.7 (2019 16:13)  HR: 89 (2019 08:23) (74 - 89)  BP: 152/66 (2019 08:23) (150/64 - 155/72)  RR: 17 (2019 08:23) (17 - 18)  SpO2: 98% (2019 08:23) (98% - 100%)    Physical Exam:    General:  Appears stated age, well-groomed, well-nourished, no distress  Eyes: EOMI  HENT:  WNL, no JVD  Chest: respirations nonlabored  Cardiovascular:  Regular rate & rhythm  Abdomen: Soft, NT/ND  : 16 [place] citizen meza catheter in place, draining clear yellow urine   Extremities: no edema bilaterally  Skin: warm and dry  Musculoskeletal: no calf tenderness  Psych: normal affect    LABS:                        12.4   8.41  )-----------( 233      ( 2019 08:09 )             37.7     04-    140  |  105  |  16  ----------------------------<  113<H>  4.1   |  30  |  1.17    Ca    8.3<L>      2019 08:09    Urinalysis Basic - ( 2019 16:11 )    Color: Yellow / Appearance: Clear / S.010 / pH: x  Gluc: x / Ketone: Negative  / Bili: Negative / Urobili: Negative   Blood: x / Protein: 15 / Nitrite: Positive   Leuk Esterase: Trace / RBC: 0-2 /HPF / WBC 3-5 /HPF   Sq Epi: x / Non Sq Epi: Occasional /HPF / Bacteria: Few /HPF    Urine Microscopic-Add On (NC) (19 @ 16:11)    Epithelial Cells: Occasional /HPF    Bacteria: Few /HPF    Comment - Urine: moderate amorphous sediments, present    Red Blood Cell - Urine: 0-2 /HPF    White Blood Cell - Urine: 3-5 /HPF    Culture - Urine (19 @ 23:26)    -  Gentamicin: S <=4    -  Imipenem: S <=1    -  Levofloxacin: S <=2    -  Meropenem: S <=1    -  Nitrofurantoin: S <=32 Should not be used to treat pyelonephritis    -  Piperacillin/Tazobactam: S <=16    -  Tigecycline: S <=2    -  Tobramycin: S <=4    -  Trimethoprim/Sulfamethoxazole: S <=2/38    -  Amikacin: S <=16    -  Ampicillin: R >16 These ampicillin results predict results for amoxicillin    -  Ampicillin/Sulbactam: I 16/8    -  Aztreonam: S <=4    -  Cefazolin: R >16 For uncomplicated UTI with K. pneumoniae, E. coli, or P. mirablis: RAJINDER <=16 is sensitive and RAJINDER >=32 is resistant. This also predicts results for oral agents cefaclor, cefdinir, cefpodoxime, cefprozil, cefuroxime axetil, cephalexin and locarbef for uncomplicated UTI. Note that some isolates may be susceptible to these agents while testing resistant to cefazolin.    -  Cefepime: S <=4    -  Cefoxitin: I 16    -  Ceftriaxone: R 8 Enterobacter, Citrobacter, and Serratia may develop resistance during prolonged therapy    -  Ciprofloxacin: S <=1    -  Ertapenem: S <=1    Specimen Source: .Urine    Culture Results:   >100,000 CFU/ml Escherichia coli    Organism Identification: Escherichia coli    Organism: Escherichia coli    Method Type: RAJINDER

## 2019-04-23 NOTE — PROGRESS NOTE ADULT - PROBLEM SELECTOR PLAN 6
Has hx of HTN  - Increased losartan to 100 qd  - Decreased carvedilol to 6.25 bid due to hypoglycemia  -received 5 mg of Amlodipine in AM due to elevated BP   - c/w  lasix 20 PO qd

## 2019-04-23 NOTE — PROGRESS NOTE ADULT - PROBLEM SELECTOR PLAN 10
c/w  Aguirre   c/w Flomox   TOV in OP setting with urology followup   Urology consult: noted     Heparin for DVT prophylaxis

## 2019-04-23 NOTE — PROGRESS NOTE ADULT - SUBJECTIVE AND OBJECTIVE BOX
PGY 1 Note discussed with supervising resident and primary attending    Patient is a 88y old  Male who presents with a chief complaint of hypoglycemia (2019 14:02)      INTERVAL HPI/OVERNIGHT EVENTS:   Patient seen and examined at the bedside,   no new complains   Aguirre placed   Urology evaluated : TOV in OP setting   c/w flomax   DC pending rehab placement     MEDICATIONS  (STANDING):  atorvastatin 20 milliGRAM(s) Oral at bedtime  carvedilol 6.25 milliGRAM(s) Oral every 12 hours  dextrose 50% Injectable 25 Gram(s) IV Push once  diVALproex Sprinkle 125 milliGRAM(s) Oral three times a day  furosemide    Tablet 20 milliGRAM(s) Oral daily  gabapentin 100 milliGRAM(s) Oral two times a day  heparin  Injectable 5000 Unit(s) SubCutaneous every 8 hours  insulin lispro (HumaLOG) corrective regimen sliding scale   SubCutaneous three times a day before meals  levoFLOXacin  Tablet 500 milliGRAM(s) Oral every 24 hours  levothyroxine 25 MICROGram(s) Oral daily  losartan 100 milliGRAM(s) Oral daily  tamsulosin 0.4 milliGRAM(s) Oral at bedtime    MEDICATIONS  (PRN):  ALBUTerol    90 MICROgram(s) HFA Inhaler 1 Puff(s) Inhalation every 6 hours PRN Bronchospasm      __________________________________________________  REVIEW OF SYSTEMS:    CONSTITUTIONAL: No fever,   EYES: no acute visual disturbances  NECK: No pain or stiffness  RESPIRATORY: No cough; No shortness of breath  CARDIOVASCULAR: No chest pain, no palpitations  GASTROINTESTINAL: No pain. No nausea or vomiting; No diarrhea   NEUROLOGICAL: No headache or numbness, no tremors  MUSCULOSKELETAL: No joint pain, no muscle pain  GENITOURINARY: no dysuria, no frequency, no hesitancy  PSYCHIATRY: no depression , no anxiety  ALL OTHER  ROS negative        Vital Signs Last 24 Hrs  T(C): 36.9 (2019 08:23), Max: 37.1 (2019 16:13)  T(F): 98.5 (2019 08:23), Max: 98.7 (2019 16:13)  HR: 89 (2019 08:23) (74 - 89)  BP: 152/66 (2019 08:23) (150/64 - 155/72)  BP(mean): --  RR: 17 (2019 08:23) (17 - 18)  SpO2: 98% (2019 08:23) (98% - 100%)    ________________________________________________  PHYSICAL EXAM:  GENERAL: male in bed   HEENT: Normocephalic;  conjunctivae and sclerae clear; moist mucous membranes;   NECK : supple  CHEST/LUNG: Clear to auscultation bilaterally with good air entry   HEART: S1 S2  regular; no murmurs, gallops or rubs  ABDOMEN: Soft, Nontender, Nondistended; Bowel sounds present  EXTREMITIES: no cyanosis; no edema; no calf tenderness  SKIN: warm and dry; no rash  NERVOUS SYSTEM:  Awake and alert;   _________________________________________________  LABS:                        12.4   8.41  )-----------( 233      ( 2019 08:09 )             37.7     04-23    140  |  105  |  16  ----------------------------<  113<H>  4.1   |  30  |  1.17    Ca    8.3<L>      2019 08:09        Urinalysis Basic - ( 2019 16:11 )    Color: Yellow / Appearance: Clear / S.010 / pH: x  Gluc: x / Ketone: Negative  / Bili: Negative / Urobili: Negative   Blood: x / Protein: 15 / Nitrite: Positive   Leuk Esterase: Trace / RBC: 0-2 /HPF / WBC 3-5 /HPF   Sq Epi: x / Non Sq Epi: Occasional /HPF / Bacteria: Few /HPF      CAPILLARY BLOOD GLUCOSE      POCT Blood Glucose.: 127 mg/dL (2019 12:00)  POCT Blood Glucose.: 109 mg/dL (2019 08:14)  POCT Blood Glucose.: 140 mg/dL (2019 21:20)  POCT Blood Glucose.: 155 mg/dL (2019 16:35)        RADIOLOGY & ADDITIONAL TESTS:    Imaging Personally Reviewed:  YES    Consultant(s) Notes Reviewed:   YES/    Care Discussed with Consultants :     Plan of care was discussed with patient and /or primary care giver; all questions and concerns were addressed and care was aligned with patient's wishes.

## 2019-04-23 NOTE — PROGRESS NOTE ADULT - PROBLEM SELECTOR PLAN 9
c/w Levoflox   follow up Urine culture / sensitivity : reviewed   - Repeat US: reviewed Excision Depth: adipose tissue

## 2019-04-24 ENCOUNTER — TRANSCRIPTION ENCOUNTER (OUTPATIENT)
Age: 84
End: 2019-04-24

## 2019-04-24 VITALS
DIASTOLIC BLOOD PRESSURE: 68 MMHG | RESPIRATION RATE: 16 BRPM | HEART RATE: 73 BPM | SYSTOLIC BLOOD PRESSURE: 162 MMHG | TEMPERATURE: 98 F | OXYGEN SATURATION: 99 %

## 2019-04-24 PROCEDURE — 71045 X-RAY EXAM CHEST 1 VIEW: CPT

## 2019-04-24 PROCEDURE — 85027 COMPLETE CBC AUTOMATED: CPT

## 2019-04-24 PROCEDURE — 80061 LIPID PANEL: CPT

## 2019-04-24 PROCEDURE — 99291 CRITICAL CARE FIRST HOUR: CPT | Mod: 25

## 2019-04-24 PROCEDURE — 82962 GLUCOSE BLOOD TEST: CPT

## 2019-04-24 PROCEDURE — 70450 CT HEAD/BRAIN W/O DYE: CPT

## 2019-04-24 PROCEDURE — 83880 ASSAY OF NATRIURETIC PEPTIDE: CPT

## 2019-04-24 PROCEDURE — 82607 VITAMIN B-12: CPT

## 2019-04-24 PROCEDURE — 80053 COMPREHEN METABOLIC PANEL: CPT

## 2019-04-24 PROCEDURE — 82306 VITAMIN D 25 HYDROXY: CPT

## 2019-04-24 PROCEDURE — 80048 BASIC METABOLIC PNL TOTAL CA: CPT

## 2019-04-24 PROCEDURE — 36415 COLL VENOUS BLD VENIPUNCTURE: CPT

## 2019-04-24 PROCEDURE — 83036 HEMOGLOBIN GLYCOSYLATED A1C: CPT

## 2019-04-24 PROCEDURE — 84100 ASSAY OF PHOSPHORUS: CPT

## 2019-04-24 PROCEDURE — 93306 TTE W/DOPPLER COMPLETE: CPT

## 2019-04-24 PROCEDURE — 83735 ASSAY OF MAGNESIUM: CPT

## 2019-04-24 PROCEDURE — 87086 URINE CULTURE/COLONY COUNT: CPT

## 2019-04-24 PROCEDURE — 87186 SC STD MICRODIL/AGAR DIL: CPT

## 2019-04-24 PROCEDURE — 81001 URINALYSIS AUTO W/SCOPE: CPT

## 2019-04-24 PROCEDURE — 99239 HOSP IP/OBS DSCHRG MGMT >30: CPT

## 2019-04-24 PROCEDURE — 84443 ASSAY THYROID STIM HORMONE: CPT

## 2019-04-24 PROCEDURE — 93005 ELECTROCARDIOGRAM TRACING: CPT

## 2019-04-24 PROCEDURE — 97530 THERAPEUTIC ACTIVITIES: CPT

## 2019-04-24 PROCEDURE — 82009 KETONE BODYS QUAL: CPT

## 2019-04-24 PROCEDURE — 97116 GAIT TRAINING THERAPY: CPT

## 2019-04-24 RX ORDER — FUROSEMIDE 40 MG
40 TABLET ORAL DAILY
Qty: 0 | Refills: 0 | Status: DISCONTINUED | OUTPATIENT
Start: 2019-04-24 | End: 2019-04-24

## 2019-04-24 RX ORDER — TAMSULOSIN HYDROCHLORIDE 0.4 MG/1
1 CAPSULE ORAL
Qty: 0 | Refills: 0 | COMMUNITY
Start: 2019-04-24

## 2019-04-24 RX ORDER — FUROSEMIDE 40 MG
20 TABLET ORAL ONCE
Qty: 0 | Refills: 0 | Status: COMPLETED | OUTPATIENT
Start: 2019-04-24 | End: 2019-04-24

## 2019-04-24 RX ORDER — FUROSEMIDE 40 MG
1 TABLET ORAL
Qty: 0 | Refills: 0 | COMMUNITY
Start: 2019-04-24

## 2019-04-24 RX ORDER — GABAPENTIN 400 MG/1
1 CAPSULE ORAL
Qty: 0 | Refills: 0 | COMMUNITY
Start: 2019-04-24

## 2019-04-24 RX ADMIN — Medication 25 MICROGRAM(S): at 05:35

## 2019-04-24 RX ADMIN — DIVALPROEX SODIUM 125 MILLIGRAM(S): 500 TABLET, DELAYED RELEASE ORAL at 14:25

## 2019-04-24 RX ADMIN — Medication 20 MILLIGRAM(S): at 05:35

## 2019-04-24 RX ADMIN — Medication 2: at 12:22

## 2019-04-24 RX ADMIN — HEPARIN SODIUM 5000 UNIT(S): 5000 INJECTION INTRAVENOUS; SUBCUTANEOUS at 05:35

## 2019-04-24 RX ADMIN — GABAPENTIN 100 MILLIGRAM(S): 400 CAPSULE ORAL at 05:35

## 2019-04-24 RX ADMIN — LOSARTAN POTASSIUM 100 MILLIGRAM(S): 100 TABLET, FILM COATED ORAL at 05:35

## 2019-04-24 RX ADMIN — DIVALPROEX SODIUM 125 MILLIGRAM(S): 500 TABLET, DELAYED RELEASE ORAL at 05:35

## 2019-04-24 RX ADMIN — HEPARIN SODIUM 5000 UNIT(S): 5000 INJECTION INTRAVENOUS; SUBCUTANEOUS at 14:25

## 2019-04-24 RX ADMIN — Medication 20 MILLIGRAM(S): at 14:25

## 2019-04-24 RX ADMIN — CARVEDILOL PHOSPHATE 6.25 MILLIGRAM(S): 80 CAPSULE, EXTENDED RELEASE ORAL at 05:35

## 2019-04-24 NOTE — PROGRESS NOTE ADULT - PROBLEM SELECTOR PLAN 6
Has hx of HTN  - Increased losartan to 100 qd  - Decreased carvedilol to 6.25 bid due to hypoglycemia    Avoiding calcium channel blocker because of patient's symptoms of pedal edema.   -received 5 mg of Amlodipine in AM due to elevated BP   - c/w  lasix 20 PO qd

## 2019-04-24 NOTE — PROGRESS NOTE ADULT - ATTENDING COMMENTS
Patient was examined and discussed with his family.   Case management has been working with his family to convince him that [facility] is best for him.   Aguirre catheter is draining.    Hypoglycemia, resolved with d/c of insulin.   UTI, treated.  Urinary retention  Continue Aguirre, alpha blockers.  Prandin, upon discharge.  No insulin.   Discharge to [facility].
Patient was interviewed and examined.  Family is at the bedside.   Family is concerned about unsteady gait, pedal edema, occasional incontinence of urine, malodorous urine, fecal soiling of his undergarments.    Patient is alert and oriented  )  Lungs, clear  Cor, RRR  Abdomen, soft  Neurological, non-focal             Labs, as above.     Hemoglobin A1C, Whole Blood (04.20.19 @ 10:42)    Hemoglobin A1C, Whole Blood: 8.4: Method: Immunoassay       Reference Range                4.0-5.6%       High risk (prediabetic)        5.7-6.4%      Hypothyroidism  < from: Transthoracic Echocardiogram (04.20.19 @ 07:17) >    CONCLUSIONS:  1. Normal mitral valve.  2. Normal trileaflet aortic valve.  3. Aortic Root: 2.9 cm.    4. Normal left atrium.  LA volume index = 17 cc/m2.  5. Mild concentric left ventricular hypertrophy.  6. Normal Left Ventricular Systolic Function,  (EF = 55 to  60%)  7. Grade I diastolic dysfunction (Impaired relaxation).  8. Normal right atrium.  9. Normal right ventricular size and systolic function  (TASE  2.2cm).  10. RV systolic pressure is normal at  31 mm Hg.  11. There is mild tricuspid regurgitation.  12. Normal pulmonic valve.  13. Trivial pericardial effusion is seen.    < end of copied text >    IMP:  Hypoglycemia, patient not tolerating insulin.          Pedal edema, right sided CHF          TSH is normal          UTI  Plan: FSBS and HSS, trial of lasix, decrease beta blocker in face of hypoglycemia          PT consultation, appreciated          Endocrinology consultation, appreciated  If UTI is sensitive, will discharge home tomorrow with home PT.  F/u new PMD, Dr. Sherman.
Patient was examined and discussed with Dr. Esposito.     Case management has been working with his family to convince him that [facility] is best for him.   Aguirre catheter is draining.    f/u u/a:  Urinalysis (04.22.19 @ 16:11)    Glucose Qualitative, Urine: Negative    Blood, Urine: Negative    pH Urine: 6.0    Color: Yellow    Urine Appearance: Clear    Bilirubin: Negative    Ketone - Urine: Negative    Specific Gravity: 1.010    Protein, Urine: 15    Urobilinogen: Negative    Nitrite: Positive    Leukocyte Esterase Concentration: Trace  Leukocyte Esterase Concentration: Moderate (04.20.19 @ 02:45)   consultation, appreciated.  Continue Aguirre, alpha blockers.  Prandin, upon discharge.  No insulin.   Await authorization for [facility].
Patient was examined at the bedside and discussed with Dr. Esposito, [facility].     He is feeling better, still has difficulty transferring from bed to chair.    Vital Signs Last 24 Hrs  T(C): 37.1 (22 Apr 2019 16:13), Max: 37.2 (22 Apr 2019 05:54)  T(F): 98.7 (22 Apr 2019 16:13), Max: 98.9 (22 Apr 2019 05:54)  HR: 74 (22 Apr 2019 16:13) (68 - 74)  BP: 151/64 (22 Apr 2019 16:13) (133/59 - 180/76)  BP(mean): --  RR: 18 (22 Apr 2019 16:13) (16 - 18)  SpO2: 100% (22 Apr 2019 16:13) (96% - 100%)  Lungs, clear  Cor, RRR  Abdomen, soft  Ext 1+ edema  Neurological, urinary retention  04-22    139  |  103  |  14  ----------------------------<  106<H>  4.3   |  30  |  1.04    Ca    8.2<L>      22 Apr 2019 07:37    IMP:  hypoglycemia, resolved          LE edema, improved          deconditioning          CHF, diastolic, compensated          hypothyroidism, managed  Patient is adamant in his desire to return home rather than go to [facility].  RN reports that wife reported that he said "I won't be here tomorrow."  He has no intent to hurt himself.
Patient was discussed with Dr. Aleman and examined at the bedside.   Wife and granddaughter are present.   Family is concerned about unsteady gait, pedal edema, occasional incontinence of urine, fecal soiling of his undergarments.    Patient is alert and oriented  Vital Signs Last 24 Hrs  T(C): 36.9 (20 Apr 2019 15:25), Max: 37.2 (19 Apr 2019 19:15)  T(F): 98.5 (20 Apr 2019 15:25), Max: 99 (19 Apr 2019 19:15)  HR: 75 (20 Apr 2019 15:25) (75 - 98)  BP: 162/93 (20 Apr 2019 15:25) (123/46 - 191/92)  BP(mean): --  RR: 18 (20 Apr 2019 15:25) (16 - 18)  SpO2: 100% (20 Apr 2019 15:25) (95% - 100%)  Lungs, clear  Cor, RRR  Abdomen, soft  Neurological, non-focal                        13.0   9.71  )-----------( 213      ( 20 Apr 2019 06:15 )             39.7   04-20    140  |  106  |  9   ----------------------------<  119<H>  3.6   |  31  |  0.94    Ca    8.4      20 Apr 2019 06:15  Phos  3.6     04-20  Mg     1.9     04-20    TPro  7.6  /  Alb  3.7  /  TBili  0.7  /  DBili  x   /  AST  22  /  ALT  23  /  AlkPhos  70  04-19  Hemoglobin A1C, Whole Blood (04.20.19 @ 10:42)    Hemoglobin A1C, Whole Blood: 8.4: Method: Immunoassay       Reference Range                4.0-5.6%       High risk (prediabetic)        5.7-6.4%       Diabetic, diagnostic             >=6.5%       ADA diabetic treatment goal       <7.0%  The Hemoglobin A1c testing is NGSP-certified.Reference ranges are based  upon the 2010 recommendations of  the American Diabetes Association.  Interpretation may vary for children  and adolescents. %  IMP:  Hypoglycemia is likely the cause of LOC.  Patient appears not to have a good comprehension of glucose control.  Despite episodes of hypoglycemia he has Hb A 1 C 8.4.  Goal should be 7.0.   Pedal edema, possible right-sided CHF vs venous insufficiency.  No history of chest pain.   Mild neuro-motor slowing.  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   Hypothyroidism  Plan: FSBS and HSS, trial of lasix, decrease beta blocker in face of hypoglycemia          Echo/Cardiology consultation          TSH          PT consultation, appreciated                   Endocrinology consultation

## 2019-04-24 NOTE — PROGRESS NOTE ADULT - SUBJECTIVE AND OBJECTIVE BOX
Medical attending note:   Patient is a 88y old  Male who presents with a chief complaint of hypoglycemia (23 Apr 2019 14:02)      INTERVAL HPI/OVERNIGHT EVENTS:   Patient seen and examined at the bedside,   no new complains   Aguirre continues  Urology evaluated : TOV in OP setting   c/w flomax   DC pending rehab placement     MEDICATIONS  (STANDING):  atorvastatin 20 milliGRAM(s) Oral at bedtime  carvedilol 6.25 milliGRAM(s) Oral every 12 hours  dextrose 50% Injectable 25 Gram(s) IV Push once  diVALproex Sprinkle 125 milliGRAM(s) Oral three times a day  furosemide    Tablet 20 milliGRAM(s) Oral daily  gabapentin 100 milliGRAM(s) Oral two times a day  heparin  Injectable 5000 Unit(s) SubCutaneous every 8 hours  insulin lispro (HumaLOG) corrective regimen sliding scale   SubCutaneous three times a day before meals  levoFLOXacin  Tablet 500 milliGRAM(s) Oral every 24 hours  levothyroxine 25 MICROGram(s) Oral daily  losartan 100 milliGRAM(s) Oral daily  tamsulosin 0.4 milliGRAM(s) Oral at bedtime    MEDICATIONS  (PRN):  ALBUTerol    90 MICROgram(s) HFA Inhaler 1 Puff(s) Inhalation every 6 hours PRN Bronchospasm      __________________________________________________  REVIEW OF SYSTEMS:    CONSTITUTIONAL: No fever,   EYES: no acute visual disturbances  NECK: No pain or stiffness  RESPIRATORY: No cough; No shortness of breath  CARDIOVASCULAR: No chest pain, no palpitations  GASTROINTESTINAL: No pain. No nausea or vomiting; No diarrhea   NEUROLOGICAL: No headache or numbness, no tremors  MUSCULOSKELETAL: No joint pain, no muscle pain  GENITOURINARY: no dysuria, no frequency, no hesitancy  PSYCHIATRY: no depression , no anxiety  ALL OTHER  ROS negative      Vital Signs Last 24 Hrs  T(C): 36.9 (24 Apr 2019 15:41), Max: 36.9 (24 Apr 2019 15:41)  T(F): 98.4 (24 Apr 2019 15:41), Max: 98.4 (24 Apr 2019 15:41)  HR: 73 (24 Apr 2019 15:41) (66 - 73)  BP: 162/68 (24 Apr 2019 15:41) (130/54 - 171/72)  BP(mean): --  RR: 16 (24 Apr 2019 15:41) (16 - 16)  SpO2: 99% (24 Apr 2019 15:41) (97% - 99%)    ________________________________________________  PHYSICAL EXAM:  GENERAL: male in bed   HEENT: Normocephalic;  conjunctivae and sclerae clear; moist mucous membranes;   NECK : supple  CHEST/LUNG: Clear to auscultation bilaterally with good air entry   HEART: S1 S2  regular; no murmurs, gallops or rubs  ABDOMEN: Soft, Nontender, Nondistended; Bowel sounds present  EXTREMITIES: no cyanosis; edema is resolved; no calf tenderness  SKIN: warm and dry; no rash  Aguirre catheter is draining clear urine.  NERVOUS SYSTEM:  Awake and alert;   _________________________________________________  POCT Blood Glucose.: 247 mg/dL (04.24.19 @ 11:43)                              12.4   8.41  )-----------( 233      ( 23 Apr 2019 08:09 )             37.7   04-23    140  |  105  |  16  ----------------------------<  113<H>  4.1   |  30  |  1.17    Ca    8.3<L>      23 Apr 2019 08:09

## 2019-04-24 NOTE — PROGRESS NOTE ADULT - PROBLEM SELECTOR PLAN 3
B/l LE edema, had some SOB on admission  Resolved.   - Denies current SOB  -   Lungs cta b/l without crackles  - Started lasix 20 qd  - F/u echo: nornal EF, I DD,

## 2019-04-24 NOTE — DISCHARGE NOTE NURSING/CASE MANAGEMENT/SOCIAL WORK - NSDCDPATPORTLINK_GEN_ALL_CORE
You can access the [facility] Patient Portal, offered by [facility], by registering with the following website: [email or web address]

## 2019-04-24 NOTE — PROGRESS NOTE ADULT - ASSESSMENT
87 yo [place] M with PMH of DM2, HTN, hypothyroidism, depression, was admitted for hypoglycemia 2/2 excess medication administration.

## 2019-04-24 NOTE — PROGRESS NOTE ADULT - NSHPATTENDINGPLANDISCUSS_GEN_ALL_CORE
Patient, family
Patient, wife, , Dr. Esposito.
Patient, wife, , Dr. Esposito.
Patient, wife, RN, Dr. Esposito
Dr. Aleman, patient, granddaughter

## 2019-04-24 NOTE — PROGRESS NOTE ADULT - PROBLEM SELECTOR PLAN 2
Hx of DM on repaglinide  - Insulin previously dc but continued taking at home  - Fs q6h  - F/u A1c: 8  - resume prandin on Dc, DC insulin,   -recommend to follow up OP to start metformin if elevated FS   - Endo consult Dr Jules: noted

## 2023-10-31 NOTE — DISCHARGE NOTE NURSING/CASE MANAGEMENT/SOCIAL WORK - NSDCPETBCESMAN_GEN_ALL_CORE
If you are a smoker, it is important for your health to stop smoking. Please be aware that second hand smoke is also harmful.
show
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

## 2024-05-12 NOTE — PROGRESS NOTE ADULT - PROBLEM/PLAN-7
DISPLAY PLAN FREE TEXT
regular

## 2025-04-14 NOTE — ED PROVIDER NOTE - NSTIMEPROVIDERCAREINITIATE_GEN_ER
Physical Therapy      Patient Name:  Juan Carlos Yoo .   MRN:  [id number]    Patient not seen today secondary to Testing (Echo).  Second attempt for today.  Will follow-up as appropriate. .     19-Apr-2019 16:38